# Patient Record
Sex: FEMALE | Race: WHITE | NOT HISPANIC OR LATINO | Employment: UNEMPLOYED | URBAN - METROPOLITAN AREA
[De-identification: names, ages, dates, MRNs, and addresses within clinical notes are randomized per-mention and may not be internally consistent; named-entity substitution may affect disease eponyms.]

---

## 2017-04-25 ENCOUNTER — NON-PROVIDER VISIT (OUTPATIENT)
Dept: OCCUPATIONAL MEDICINE | Facility: CLINIC | Age: 42
End: 2017-04-25

## 2017-04-25 DIAGNOSIS — Z02.1 PRE-EMPLOYMENT DRUG SCREENING: ICD-10-CM

## 2017-04-25 LAB
AMP AMPHETAMINE: NORMAL
COC COCAINE: NORMAL
INT CON NEG: NORMAL
INT CON POS: NORMAL
MET METHAMPHETAMINES: NORMAL
OPI OPIATES: NORMAL
PCP PHENCYCLIDINE: NORMAL
POC DRUG COMMENT 753798-OCCUPATIONAL HEALTH: NEGATIVE
THC: NORMAL

## 2017-04-25 PROCEDURE — 80305 DRUG TEST PRSMV DIR OPT OBS: CPT | Performed by: PREVENTIVE MEDICINE

## 2017-04-25 NOTE — MR AVS SNAPSHOT
Vicky Robertson Kenny   2017 3:20 PM   Non-Provider Visit   MRN: [id number]    Department:  [facility]   Dept Phone:  [phone]    Description:  Female : 1975   Provider:  KEV LAKHANI MA           Reason for Visit     Other pre-employment ds      Allergies as of 2017     Not on File      You were diagnosed with     Pre-employment drug screening   [330723]         Basic Information     Date Of Birth Sex Race Ethnicity Preferred Language    1975 Female White Non- English      Health Maintenance     Patient has no pending health maintenance at this time      Results     POCT 6 Panel Urine Drug Screen      Component    AMPHETAMINE    POC THC    COCAINE    OPIATES    PHENCYCLIDINE    METHAMPHETAMINES    POC Urine Drug Screen Comment    NEGATIVE    Internal Control Positive    Valid    Internal Control Negative    Valid                        Current Immunizations     No immunizations on file.      Below and/or attached are the medications your provider expects you to take. Review all of your home medications and newly ordered medications with your provider and/or pharmacist. Follow medication instructions as directed by your provider and/or pharmacist. Please keep your medication list with you and share with your provider. Update the information when medications are discontinued, doses are changed, or new medications (including over-the-counter products) are added; and carry medication information at all times in the event of emergency situations     Allergies:  (Not on file)          Medications  Valid as of: 2017 -  4:59 PM    Generic Name Brand Name Tablet Size Instructions for use    .                 Medicines prescribed today were sent to:     None      Medication refill instructions:       If your prescription bottle indicates you have medication refills left, it is not necessary to call your provider’s office. Please contact your pharmacy and they will  refill your medication.    If your prescription bottle indicates you do not have any refills left, you may request refills at any time through one of the following ways: The online [employer] system (except Urgent Care), by calling your provider’s office, or by asking your pharmacy to contact your provider’s office with a refill request. Medication refills are processed only during regular business hours and may not be available until the next business day. Your provider may request additional information or to have a follow-up visit with you prior to refilling your medication.   *Please Note: Medication refills are assigned a new Rx number when refilled electronically. Your pharmacy may indicate that no refills were authorized even though a new prescription for the same medication is available at the pharmacy. Please request the medicine by name with the pharmacy before contacting your provider for a refill.           [facility] Status: Patient Declined

## 2017-08-06 ENCOUNTER — HOSPITAL ENCOUNTER (EMERGENCY)
Dept: HOSPITAL 8 - ED | Age: 42
Discharge: HOME | End: 2017-08-06
Payer: COMMERCIAL

## 2017-08-06 VITALS — HEIGHT: 62 IN | WEIGHT: 114.2 LBS | BODY MASS INDEX: 21.02 KG/M2

## 2017-08-06 VITALS — SYSTOLIC BLOOD PRESSURE: 136 MMHG | DIASTOLIC BLOOD PRESSURE: 91 MMHG
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
## 2017-08-06 DIAGNOSIS — S50.11XA: Primary | ICD-10-CM

## 2017-08-06 PROCEDURE — 99283 EMERGENCY DEPT VISIT LOW MDM: CPT

## 2017-09-26 ENCOUNTER — OFFICE VISIT (OUTPATIENT)
Dept: MEDICAL GROUP | Facility: MEDICAL CENTER | Age: 42
End: 2017-09-26
Payer: COMMERCIAL

## 2017-09-26 VITALS
TEMPERATURE: 97.8 F | WEIGHT: 108.4 LBS | DIASTOLIC BLOOD PRESSURE: 72 MMHG | HEART RATE: 92 BPM | HEIGHT: 62 IN | RESPIRATION RATE: 16 BRPM | SYSTOLIC BLOOD PRESSURE: 110 MMHG | OXYGEN SATURATION: 100 % | BODY MASS INDEX: 19.95 KG/M2

## 2017-09-26 DIAGNOSIS — J06.9 ACUTE URI: ICD-10-CM

## 2017-09-26 DIAGNOSIS — F99: ICD-10-CM

## 2017-09-26 DIAGNOSIS — Z76.89 ENCOUNTER TO ESTABLISH CARE: ICD-10-CM

## 2017-09-26 PROCEDURE — 99204 OFFICE O/P NEW MOD 45 MIN: CPT | Performed by: PHYSICIAN ASSISTANT

## 2017-09-26 ASSESSMENT — PATIENT HEALTH QUESTIONNAIRE - PHQ9: CLINICAL INTERPRETATION OF PHQ2 SCORE: 0

## 2017-09-26 NOTE — ASSESSMENT & PLAN NOTE
This is a 41-year-old female complains of an almost 1 month history of sinus congestion drainage and coughing. Initially she was seen at an ED and given an antibiotic for strep throat. Strep throat was diagnosed on a rapid strep culture. She took the antibiotic for 6 days but couldn't handle diarrhea. Stop the medication. Denies any sore throat today. She did have a fever last night. It was 101. He is also coughing last night. Symptoms today have improved. She denies any shortness of breath or chest pain. She has missed work for many weeks and wants to go back to work. She is taking over-the-counter cold medications.

## 2017-09-26 NOTE — ASSESSMENT & PLAN NOTE
She has bipolar. She is not sure what type it is. She is on medications but doesn't know the medications. She also has a history of substance abuse. Use of methamphetamines which she stopped earlier this year. She is currently not with a psychiatrist. She complains of lots of stress in her life.

## 2017-09-26 NOTE — PROGRESS NOTES
"Subjective:   Vicky Cox is a 41 y.o. female here today for cold symptoms for almost a month.    Acute URI  This is a 41-year-old female complains of an almost 1 month history of sinus congestion drainage and coughing. Initially she was seen at an ED and given an antibiotic for strep throat. Strep throat was diagnosed on a rapid strep culture. She took the antibiotic for 6 days but couldn't handle diarrhea. Stop the medication. Denies any sore throat today. She did have a fever last night. It was 101. He is also coughing last night. Symptoms today have improved. She denies any shortness of breath or chest pain. She has missed work for many weeks and wants to go back to work. She is taking over-the-counter cold medications.    Co-occurrence of multiple mental disorders  She has bipolar. She is not sure what type it is. She is on medications but doesn't know the medications. She also has a history of substance abuse. Use of methamphetamines which she stopped earlier this year. She is currently not with a psychiatrist. She complains of lots of stress in her life.         Current medicines (including changes today)  No current outpatient prescriptions on file.     No current facility-administered medications for this visit.      She  has no past medical history on file.    ROS  No chest pain, no shortness of breath, no abdominal pain and all other systems were reviewed and are negative.       Objective:     Blood pressure 110/72, pulse 92, temperature 36.6 °C (97.8 °F), resp. rate 16, height 1.575 m (5' 2\"), weight 49.2 kg (108 lb 6.4 oz), last menstrual period 09/23/2017, SpO2 100 %, not currently breastfeeding. Body mass index is 19.83 kg/m².   Physical Exam:  Constitutional: Alert, no distress.  Skin: Warm, dry, good turgor, no rashes in visible areas.  Eye: Equal, round and reactive, conjunctiva clear, lids normal.  ENMT: Lips without lesions, good dentition, oropharynx clear.  Neck: Trachea midline, no " masses.   Lymph: No cervical or supraclavicular lymphadenopathy  Respiratory: Unlabored respiratory effort, lungs clear to auscultation, no wheezes, no ronchi.  Cardiovascular: Normal S1, S2, no murmur, no edema.  Abdomen: Soft, non-tender, no masses.  Psych: Alert and oriented x3, normal affect and mood.        Assessment and Plan:   The following treatment plan was discussed    1. Acute URI  Acute, new onset condition. Vitals and physical exam were unremarkable. Likely continuous viral symptom. Push fluids. May continue over-the-counter so cold medications. Contact me if she develops any worsening symptoms such as consistent fever.    2. Co-occurrence of multiple mental disorders  Chronic condition. Advised to follow up with a psychiatrist. She is looking for a psychiatrist that as well versed in cooccurrence. She will notify me if she needs a referral.    3. Encounter to establish care      Followup: Return if symptoms worsen or fail to improve.    Please note that this dictation was created using voice recognition software. I have made every reasonable attempt to correct obvious errors, but I expect that there are errors of grammar and possibly content that I did not discover before finalizing the note.

## 2017-09-26 NOTE — LETTER
September 26, 2017         Patient: Vicky Cox   YOB: 1975   Date of Visit: 9/26/2017           To Whom it May Concern:    Vicky Cox was seen in my clinic on 9/26/2017. She may return to work without restrictions on 9/27/17.    If you have any questions or concerns, please don't hesitate to call.        Sincerely,           Dale Ch P.A.-C.  Electronically Signed

## 2017-10-09 ENCOUNTER — APPOINTMENT (OUTPATIENT)
Dept: MEDICAL GROUP | Facility: MEDICAL CENTER | Age: 42
End: 2017-10-09
Payer: COMMERCIAL

## 2017-10-10 ENCOUNTER — OFFICE VISIT (OUTPATIENT)
Dept: MEDICAL GROUP | Facility: MEDICAL CENTER | Age: 42
End: 2017-10-10
Payer: COMMERCIAL

## 2017-10-10 ENCOUNTER — APPOINTMENT (OUTPATIENT)
Dept: MEDICAL GROUP | Facility: MEDICAL CENTER | Age: 42
End: 2017-10-10
Payer: COMMERCIAL

## 2017-10-10 VITALS
DIASTOLIC BLOOD PRESSURE: 70 MMHG | OXYGEN SATURATION: 97 % | HEART RATE: 76 BPM | WEIGHT: 107.4 LBS | RESPIRATION RATE: 16 BRPM | HEIGHT: 62 IN | SYSTOLIC BLOOD PRESSURE: 114 MMHG | BODY MASS INDEX: 19.77 KG/M2 | TEMPERATURE: 98.1 F

## 2017-10-10 DIAGNOSIS — M25.561 ACUTE PAIN OF RIGHT KNEE: ICD-10-CM

## 2017-10-10 PROBLEM — J06.9 ACUTE URI: Status: RESOLVED | Noted: 2017-09-26 | Resolved: 2017-10-10

## 2017-10-10 PROCEDURE — 99213 OFFICE O/P EST LOW 20 MIN: CPT | Performed by: PHYSICIAN ASSISTANT

## 2017-10-10 RX ORDER — NAPROXEN 500 MG/1
500 TABLET ORAL 2 TIMES DAILY WITH MEALS
Qty: 60 TAB | Refills: 0 | Status: SHIPPED | OUTPATIENT
Start: 2017-10-10 | End: 2021-11-23

## 2017-10-10 NOTE — ASSESSMENT & PLAN NOTE
This is a 41-year-old female accompanied by her son. She complains of an approximate four-day history of right knee pain. Denies trauma. Pain initially started with swelling. Pain is generalized but worse in the back and radiates up the leg. Over the past 3 days she has not been going to work. She works in [employer]. Has to lift and move lots of boxes daily. States is a very physically demanding job. She returns home very fatigued after her job. She has been taking ibuprofen 600 mg once or twice a day. Been elevating and icing it. Or a brace. Still with pain in the back of her knee. Has difficult time fully extending the knee.

## 2017-10-10 NOTE — PROGRESS NOTES
"Subjective:   Vicky Cox is a 41 y.o. female here today for right knee pain for approximately 4 days.    Acute pain of right knee  This is a 41-year-old female accompanied by her son. She complains of an approximate four-day history of right knee pain. Denies trauma. Pain initially started with swelling. Pain is generalized but worse in the back and radiates up the leg. Over the past 3 days she has not been going to work. She works in [employer]. Has to lift and move lots of boxes daily. States is a very physically demanding job. She returns home very fatigued after her job. She has been taking ibuprofen 600 mg once or twice a day. Been elevating and icing it. Or a brace. Still with pain in the back of her knee. Has difficult time fully extending the knee.       Current medicines (including changes today)  Current Outpatient Prescriptions   Medication Sig Dispense Refill   • naproxen (NAPROSYN) 500 MG Tab Take 1 Tab by mouth 2 times a day, with meals. 60 Tab 0     No current facility-administered medications for this visit.      She  has no past medical history on file.    ROS   No chest pain, no shortness of breath, no abdominal pain and all other systems were reviewed and are negative.       Objective:     Blood pressure 114/70, pulse 76, temperature 36.7 °C (98.1 °F), resp. rate 16, height 1.575 m (5' 2\"), weight 48.7 kg (107 lb 6.4 oz), last menstrual period 09/23/2017, SpO2 97 %. Body mass index is 19.64 kg/m².   Physical Exam:  Constitutional: Alert, no distress.  Skin: Warm, dry, good turgor, no rashes in visible areas.  Eye: Equal, round and reactive, conjunctiva clear, lids normal.  ENMT: Lips without lesions, good dentition, oropharynx clear.  Neck: Trachea midline, no masses.   Respiratory: Unlabored respiratory effort, lungs appear clear, no wheezes.  Cardiovascular: Normal S1, S2, no murmur, no edema.  Musculoskeletal: Bilateral knees appear symmetrical. No effusion noted. No ecchymosis. Able " to extend knee fully with assist. Muscle strength is 5 out of 5. Generalized tenderness in the worsen the back. No significant change in gait.  Psych: Alert and oriented x3, normal affect and mood.        Assessment and Plan:   The following treatment plan was discussed    1. Acute pain of right knee  New-onset condition. Likely muscle strain. Prescribed naproxen twice a day with food. Note written for light duty for 2 weeks. Advised to contact me if she has repeated swelling of the knee. I referred her to physical therapy and "[employer]".  - naproxen (NAPROSYN) 500 MG Tab; Take 1 Tab by mouth 2 times a day, with meals.  Dispense: 60 Tab; Refill: 0  - REFERRAL TO PHYSICAL THERAPY Reason for Therapy: Eval/Treat/Report      Followup: Return if symptoms worsen or fail to improve.    Please note that this dictation was created using voice recognition software. I have made every reasonable attempt to correct obvious errors, but I expect that there are errors of grammar and possibly content that I did not discover before finalizing the note.

## 2017-10-10 NOTE — LETTER
October 10, 2017         Patient: Vicky Cox   YOB: 1975   Date of Visit: 10/10/2017           To Whom it May Concern:    Vicky Cox was seen in my clinic on 10/10/2017. Please allow her to perform light duty for two weeks so no pulling, lifting or pushing 20 pounds until 10/24/17.    If you have any questions or concerns, please don't hesitate to call.        Sincerely,           Dale Ch P.A.-C.  Electronically Signed

## 2018-06-05 ENCOUNTER — OFFICE VISIT (OUTPATIENT)
Dept: URGENT CARE | Facility: CLINIC | Age: 43
End: 2018-06-05
Payer: COMMERCIAL

## 2018-06-05 VITALS
DIASTOLIC BLOOD PRESSURE: 84 MMHG | RESPIRATION RATE: 16 BRPM | TEMPERATURE: 98.5 F | OXYGEN SATURATION: 100 % | HEIGHT: 62 IN | WEIGHT: 107 LBS | BODY MASS INDEX: 19.69 KG/M2 | HEART RATE: 82 BPM | SYSTOLIC BLOOD PRESSURE: 120 MMHG

## 2018-06-05 DIAGNOSIS — K52.9 AGE (ACUTE GASTROENTERITIS): ICD-10-CM

## 2018-06-05 PROCEDURE — 99214 OFFICE O/P EST MOD 30 MIN: CPT | Performed by: FAMILY MEDICINE

## 2018-06-05 RX ORDER — ONDANSETRON 4 MG/1
4 TABLET, ORALLY DISINTEGRATING ORAL ONCE
Status: COMPLETED | OUTPATIENT
Start: 2018-06-05 | End: 2018-06-05

## 2018-06-05 RX ORDER — ONDANSETRON 4 MG/1
4 TABLET, ORALLY DISINTEGRATING ORAL EVERY 8 HOURS PRN
Qty: 12 TAB | Refills: 0 | Status: SHIPPED | OUTPATIENT
Start: 2018-06-05 | End: 2021-11-23

## 2018-06-05 RX ADMIN — ONDANSETRON 4 MG: 4 TABLET, ORALLY DISINTEGRATING ORAL at 12:58

## 2018-06-05 ASSESSMENT — ENCOUNTER SYMPTOMS
FOCAL WEAKNESS: 0
DIARRHEA: 1
NAUSEA: 1
CHILLS: 0
DIZZINESS: 0
FEVER: 0
VOMITING: 1
BLOOD IN STOOL: 0

## 2018-06-05 NOTE — LETTER
June 5, 2018         Patient: Vicky Cox   YOB: 1975   Date of Visit: 6/5/2018           To Whom it May Concern:    Vicky Cox was seen in my clinic on 6/5/2018. She may return to work in 2-3 days.    If you have any questions or concerns, please don't hesitate to call.        Sincerely,           Maxwell Gibson M.D.  Electronically Signed

## 2018-06-05 NOTE — PROGRESS NOTES
"Subjective:      Vicky Cox is a 42 y.o. female who presents with Emesis (x 6 days, diarrhea, emesis each day, fever, threw up this morning, not being able to eat. )    Chief Complaint   Patient presents with   • Emesis     x 6 days, diarrhea, emesis each day, fever, threw up this morning, not being able to eat.         - This is a very pleasant 42 y.o. female with complaints of non-bloody vomiting/diarrhea ~2-6x/days for past 5 days. No abd pain or fever past couple days or recent travel or abx use.           ALLERGIES:  Penicillins     PMH:  History reviewed. No pertinent past medical history.     MEDS:    Current Outpatient Prescriptions:   •  ondansetron (ZOFRAN ODT) 4 MG TABLET DISPERSIBLE, Take 1 Tab by mouth every 8 hours as needed., Disp: 12 Tab, Rfl: 0  •  naproxen (NAPROSYN) 500 MG Tab, Take 1 Tab by mouth 2 times a day, with meals., Disp: 60 Tab, Rfl: 0    Current Facility-Administered Medications:   •  ondansetron (ZOFRAN ODT) dispertab 4 mg, 4 mg, Oral, Once, Maxwell Gibson M.D.    ** I have documented what I find to be significant in regards to past medical, social, family and surgical history  in my HPI or under PMH/PSH/FH review section, otherwise it is contributory **           HPI    Review of Systems   Constitutional: Negative for chills and fever.   Gastrointestinal: Positive for diarrhea, nausea and vomiting. Negative for blood in stool.   Neurological: Negative for dizziness and focal weakness.          Objective:     /84   Pulse 82   Temp 36.9 °C (98.5 °F)   Resp 16   Ht 1.575 m (5' 2\")   Wt 48.5 kg (107 lb)   SpO2 100%   BMI 19.57 kg/m²      Physical Exam   Constitutional: She appears well-developed. No distress.   HENT:   Head: Normocephalic and atraumatic.   Mouth/Throat: Oropharynx is clear and moist.   Eyes: Conjunctivae are normal.   Neck: Neck supple.   Cardiovascular: Regular rhythm.    No murmur heard.  Pulmonary/Chest: Effort normal. No respiratory distress. "   Abdominal: Soft. There is no tenderness. There is no rebound and no guarding.   Neurological: She is alert. She exhibits normal muscle tone.   Skin: Skin is warm and dry.   Psychiatric: She has a normal mood and affect. Judgment normal.   Nursing note and vitals reviewed.              Assessment/Plan:         1. AGE (acute gastroenteritis)  ondansetron (ZOFRAN ODT) 4 MG TABLET DISPERSIBLE    ondansetron (ZOFRAN ODT) dispertab 4 mg         - rest hydrate   - bland diet     Dx & d/c instructions discussed w/ patient and/or family members. Follow up w/ Prvt Dr or here in 3-4 days if not getting better, sooner if needed,  ER if worse and UC/PCP unavailable.        Possible side effects (i.e. Rash, GI upset/constipation, sedation, elevation of BP or sugars) of any medications given discussed.

## 2018-06-21 ENCOUNTER — APPOINTMENT (OUTPATIENT)
Dept: MEDICAL GROUP | Facility: MEDICAL CENTER | Age: 43
End: 2018-06-21
Payer: COMMERCIAL

## 2019-08-05 ENCOUNTER — HOSPITAL ENCOUNTER (EMERGENCY)
Dept: HOSPITAL 8 - ED | Age: 44
Discharge: HOME | End: 2019-08-05
Payer: MEDICAID

## 2019-08-05 VITALS — DIASTOLIC BLOOD PRESSURE: 87 MMHG | SYSTOLIC BLOOD PRESSURE: 130 MMHG

## 2019-08-05 VITALS — HEIGHT: 62 IN | WEIGHT: 115.3 LBS | BODY MASS INDEX: 21.22 KG/M2

## 2019-08-05 DIAGNOSIS — N10: Primary | ICD-10-CM

## 2019-08-05 DIAGNOSIS — Z98.890: ICD-10-CM

## 2019-08-05 PROCEDURE — 36415 COLL VENOUS BLD VENIPUNCTURE: CPT

## 2019-08-05 PROCEDURE — 84703 CHORIONIC GONADOTROPIN ASSAY: CPT

## 2019-08-05 PROCEDURE — 83690 ASSAY OF LIPASE: CPT

## 2019-08-05 PROCEDURE — 81001 URINALYSIS AUTO W/SCOPE: CPT

## 2019-08-05 PROCEDURE — 76700 US EXAM ABDOM COMPLETE: CPT

## 2019-08-05 PROCEDURE — 87086 URINE CULTURE/COLONY COUNT: CPT

## 2019-08-05 PROCEDURE — 80053 COMPREHEN METABOLIC PANEL: CPT

## 2019-08-05 PROCEDURE — 99284 EMERGENCY DEPT VISIT MOD MDM: CPT

## 2019-08-05 PROCEDURE — 85025 COMPLETE CBC W/AUTO DIFF WBC: CPT

## 2020-01-21 ENCOUNTER — HOSPITAL ENCOUNTER (EMERGENCY)
Dept: HOSPITAL 8 - ED | Age: 45
Discharge: HOME | End: 2020-01-21
Payer: MEDICAID

## 2020-01-21 VITALS — DIASTOLIC BLOOD PRESSURE: 79 MMHG | SYSTOLIC BLOOD PRESSURE: 107 MMHG

## 2020-01-21 VITALS — BODY MASS INDEX: 21.42 KG/M2 | HEIGHT: 62 IN | WEIGHT: 116.4 LBS

## 2020-01-21 DIAGNOSIS — B34.9: ICD-10-CM

## 2020-01-21 DIAGNOSIS — J45.31: Primary | ICD-10-CM

## 2020-01-21 PROCEDURE — 71046 X-RAY EXAM CHEST 2 VIEWS: CPT

## 2020-01-21 PROCEDURE — 99283 EMERGENCY DEPT VISIT LOW MDM: CPT

## 2020-01-21 PROCEDURE — 94640 AIRWAY INHALATION TREATMENT: CPT

## 2020-09-02 ENCOUNTER — HOSPITAL ENCOUNTER (EMERGENCY)
Dept: HOSPITAL 8 - ED | Age: 45
Discharge: HOME | End: 2020-09-02
Payer: MEDICAID

## 2020-09-02 VITALS — SYSTOLIC BLOOD PRESSURE: 120 MMHG | DIASTOLIC BLOOD PRESSURE: 75 MMHG

## 2020-09-02 VITALS — WEIGHT: 113.32 LBS | HEIGHT: 63 IN | BODY MASS INDEX: 20.08 KG/M2

## 2020-09-02 DIAGNOSIS — F17.210: ICD-10-CM

## 2020-09-02 DIAGNOSIS — R06.02: ICD-10-CM

## 2020-09-02 DIAGNOSIS — M47.814: Primary | ICD-10-CM

## 2020-09-02 DIAGNOSIS — R10.13: ICD-10-CM

## 2020-09-02 DIAGNOSIS — R00.0: ICD-10-CM

## 2020-09-02 DIAGNOSIS — J45.909: ICD-10-CM

## 2020-09-02 DIAGNOSIS — R11.0: ICD-10-CM

## 2020-09-02 DIAGNOSIS — R07.9: ICD-10-CM

## 2020-09-02 LAB
ALBUMIN SERPL-MCNC: 3.8 G/DL (ref 3.4–5)
ALP SERPL-CCNC: 60 U/L (ref 45–117)
ALT SERPL-CCNC: 16 U/L (ref 12–78)
ANION GAP SERPL CALC-SCNC: 5 MMOL/L (ref 5–15)
BASOPHILS # BLD AUTO: 0.02 X10^3/UL (ref 0–0.1)
BASOPHILS NFR BLD AUTO: 0 % (ref 0–1)
BILIRUB SERPL-MCNC: 0.8 MG/DL (ref 0.2–1)
CALCIUM SERPL-MCNC: 9.6 MG/DL (ref 8.5–10.1)
CHLORIDE SERPL-SCNC: 107 MMOL/L (ref 98–107)
CREAT SERPL-MCNC: 0.79 MG/DL (ref 0.55–1.02)
EOSINOPHIL # BLD AUTO: 0.06 X10^3/UL (ref 0–0.4)
EOSINOPHIL NFR BLD AUTO: 1 % (ref 1–7)
ERYTHROCYTE [DISTWIDTH] IN BLOOD BY AUTOMATED COUNT: 12.8 % (ref 9.6–15.2)
LYMPHOCYTES # BLD AUTO: 1.49 X10^3/UL (ref 1–3.4)
LYMPHOCYTES NFR BLD AUTO: 14 % (ref 22–44)
MCH RBC QN AUTO: 30.3 PG (ref 27–34.8)
MCHC RBC AUTO-ENTMCNC: 32.7 G/DL (ref 32.4–35.8)
MCV RBC AUTO: 92.5 FL (ref 80–100)
MD: NO
MONOCYTES # BLD AUTO: 1.11 X10^3/UL (ref 0.2–0.8)
MONOCYTES NFR BLD AUTO: 11 % (ref 2–9)
NEUTROPHILS # BLD AUTO: 7.81 X10^3/UL (ref 1.8–6.8)
NEUTROPHILS NFR BLD AUTO: 75 % (ref 42–75)
PLATELET # BLD AUTO: 217 X10^3/UL (ref 130–400)
PMV BLD AUTO: 8.9 FL (ref 7.4–10.4)
PROT SERPL-MCNC: 8 G/DL (ref 6.4–8.2)
RBC # BLD AUTO: 4.59 X10^6/UL (ref 3.82–5.3)

## 2020-09-02 PROCEDURE — 36415 COLL VENOUS BLD VENIPUNCTURE: CPT

## 2020-09-02 PROCEDURE — 93005 ELECTROCARDIOGRAM TRACING: CPT

## 2020-09-02 PROCEDURE — 85379 FIBRIN DEGRADATION QUANT: CPT

## 2020-09-02 PROCEDURE — 80053 COMPREHEN METABOLIC PANEL: CPT

## 2020-09-02 PROCEDURE — 99285 EMERGENCY DEPT VISIT HI MDM: CPT

## 2020-09-02 PROCEDURE — 96374 THER/PROPH/DIAG INJ IV PUSH: CPT

## 2020-09-02 PROCEDURE — 85025 COMPLETE CBC W/AUTO DIFF WBC: CPT

## 2020-09-02 PROCEDURE — 96375 TX/PRO/DX INJ NEW DRUG ADDON: CPT

## 2020-09-02 PROCEDURE — 71046 X-RAY EXAM CHEST 2 VIEWS: CPT

## 2020-09-02 PROCEDURE — 83690 ASSAY OF LIPASE: CPT

## 2020-09-02 PROCEDURE — 84703 CHORIONIC GONADOTROPIN ASSAY: CPT

## 2020-09-02 NOTE — NUR
PT RESTING ON GURNEY IN NAD, REPORTS PAIN HAS SUBSIDED. VSS. FALL PRECAUTIONS 
IN PLACE. CALL LIGHT WITHIN REACH.

## 2020-10-07 ENCOUNTER — HOSPITAL ENCOUNTER (OUTPATIENT)
Dept: RADIOLOGY | Facility: MEDICAL CENTER | Age: 45
End: 2020-10-07
Attending: FAMILY MEDICINE
Payer: MEDICAID

## 2020-10-07 DIAGNOSIS — N92.6 IRREGULAR MENSTRUAL BLEEDING: ICD-10-CM

## 2021-03-11 ENCOUNTER — OFFICE VISIT (OUTPATIENT)
Dept: URGENT CARE | Facility: CLINIC | Age: 46
End: 2021-03-11
Payer: MEDICAID

## 2021-03-11 VITALS
DIASTOLIC BLOOD PRESSURE: 68 MMHG | OXYGEN SATURATION: 97 % | HEIGHT: 63 IN | BODY MASS INDEX: 18.96 KG/M2 | TEMPERATURE: 97 F | SYSTOLIC BLOOD PRESSURE: 104 MMHG | HEART RATE: 93 BPM | WEIGHT: 107 LBS | RESPIRATION RATE: 16 BRPM

## 2021-03-11 DIAGNOSIS — H60.311 ACUTE DIFFUSE OTITIS EXTERNA OF RIGHT EAR: ICD-10-CM

## 2021-03-11 DIAGNOSIS — H65.01 RIGHT ACUTE SEROUS OTITIS MEDIA, RECURRENCE NOT SPECIFIED: ICD-10-CM

## 2021-03-11 PROCEDURE — 99213 OFFICE O/P EST LOW 20 MIN: CPT | Performed by: NURSE PRACTITIONER

## 2021-03-11 RX ORDER — BREXPIPRAZOLE 1 MG/1
1 TABLET ORAL
COMMUNITY
Start: 2021-01-25 | End: 2023-03-16

## 2021-03-11 RX ORDER — AZITHROMYCIN 250 MG/1
TABLET, FILM COATED ORAL
Qty: 6 TABLET | Refills: 0 | Status: SHIPPED | OUTPATIENT
Start: 2021-03-11 | End: 2021-11-23

## 2021-03-11 RX ORDER — DULOXETIN HYDROCHLORIDE 20 MG/1
20 CAPSULE, DELAYED RELEASE ORAL
COMMUNITY
Start: 2021-01-25 | End: 2023-03-16

## 2021-03-11 RX ORDER — OFLOXACIN 3 MG/ML
5 SOLUTION AURICULAR (OTIC) DAILY
Qty: 10 ML | Refills: 0 | Status: SHIPPED | OUTPATIENT
Start: 2021-03-11 | End: 2021-03-18

## 2021-03-11 RX ORDER — BREXPIPRAZOLE 2 MG/1
1 TABLET ORAL
COMMUNITY
Start: 2021-01-26 | End: 2021-11-23

## 2021-03-11 NOTE — PROGRESS NOTES
Subjective:      Vicky Cox is a 45 y.o. female who presents with Cerumen Impaction (x3, (R) ear, muffled hearing, ear pain )    History reviewed. No pertinent past medical history.  Social History     Socioeconomic History   • Marital status:      Spouse name: Not on file   • Number of children: Not on file   • Years of education: Not on file   • Highest education level: Not on file   Occupational History   • Not on file   Tobacco Use   • Smoking status: Former Smoker     Quit date: 9/3/2017     Years since quitting: 3.5   • Smokeless tobacco: Never Used   Substance and Sexual Activity   • Alcohol use: Yes     Comment: occasionally   • Drug use: No     Types: Methamphetamines     Comment: sober since 02/17   • Sexual activity: Not Currently     Partners: Male   Other Topics Concern   • Not on file   Social History Narrative   • Not on file     Social Determinants of Health     Financial Resource Strain:    • Difficulty of Paying Living Expenses:    Food Insecurity:    • Worried About Running Out of Food in the Last Year:    • Ran Out of Food in the Last Year:    Transportation Needs:    • Lack of Transportation (Medical):    • Lack of Transportation (Non-Medical):    Physical Activity:    • Days of Exercise per Week:    • Minutes of Exercise per Session:    Stress:    • Feeling of Stress :    Social Connections:    • Frequency of Communication with Friends and Family:    • Frequency of Social Gatherings with Friends and Family:    • Attends [employer] Services:    • Active Member of Clubs or Organizations:    • Attends Club or Organization Meetings:    • Marital Status:    Intimate Partner Violence:    • Fear of Current or Ex-Partner:    • Emotionally Abused:    • Physically Abused:    • Sexually Abused:      History reviewed. No pertinent family history.    Allergies: Penicillins    Patient is a 45-year-old female who presents today with complaint of concern for impactions of cerumen to the right  "ear.  Patient states 3 days ago her right ear began hurting and she has noted decreased hearing.  Patient states that she has had a previous history of ear infections.  She does have seasonal allergies as well.  No other upper respiratory symptoms.        Cerumen Impaction  This is a new problem. The current episode started in the past 7 days. The problem occurs constantly. The problem has been unchanged. Nothing aggravates the symptoms. She has tried nothing for the symptoms. The treatment provided no relief.       Review of Systems   HENT: Positive for hearing loss.    All other systems reviewed and are negative.         Objective:     /68 (Patient Position: Sitting)   Pulse 93   Temp 36.1 °C (97 °F) (Temporal)   Resp 16   Ht 1.6 m (5' 3\")   Wt 48.5 kg (107 lb)   SpO2 97%   BMI 18.95 kg/m²      Physical Exam  Vitals reviewed.   Constitutional:       Appearance: Normal appearance.   HENT:      Head: Normocephalic and atraumatic.      Right Ear: There is no impacted cerumen.      Left Ear: Tympanic membrane, ear canal and external ear normal. There is no impacted cerumen.      Ears:      Comments: There is no impacted cerumen in the right EAC.  However, the EAC is bright red and there is redness of the right tympanic membrane.     Nose: Nose normal.   Eyes:      Extraocular Movements: Extraocular movements intact.      Conjunctiva/sclera: Conjunctivae normal.      Pupils: Pupils are equal, round, and reactive to light.   Neurological:      Mental Status: She is alert.                 Assessment/Plan:     Right otitis media  Right otitis externa  Decreased hearing; suspect eustachian tube dysfunction    Floxin otic drops  Zithromax  Flonase  Consider referral to ENT for persistent or worsening of symptoms  Call or return to urgent care otherwise for any further questions or concerns       There are no diagnoses linked to this encounter.    "

## 2021-11-23 ENCOUNTER — OFFICE VISIT (OUTPATIENT)
Dept: URGENT CARE | Facility: CLINIC | Age: 46
End: 2021-11-23
Payer: MEDICAID

## 2021-11-23 ENCOUNTER — HOSPITAL ENCOUNTER (OUTPATIENT)
Facility: MEDICAL CENTER | Age: 46
End: 2021-11-23
Attending: NURSE PRACTITIONER
Payer: MEDICAID

## 2021-11-23 VITALS
BODY MASS INDEX: 23.15 KG/M2 | DIASTOLIC BLOOD PRESSURE: 60 MMHG | TEMPERATURE: 97 F | HEIGHT: 62 IN | RESPIRATION RATE: 18 BRPM | OXYGEN SATURATION: 96 % | WEIGHT: 125.8 LBS | SYSTOLIC BLOOD PRESSURE: 110 MMHG | HEART RATE: 99 BPM

## 2021-11-23 DIAGNOSIS — J22 LRTI (LOWER RESPIRATORY TRACT INFECTION): ICD-10-CM

## 2021-11-23 DIAGNOSIS — R05.9 COUGH: ICD-10-CM

## 2021-11-23 PROCEDURE — 99214 OFFICE O/P EST MOD 30 MIN: CPT | Performed by: NURSE PRACTITIONER

## 2021-11-23 PROCEDURE — U0005 INFEC AGEN DETEC AMPLI PROBE: HCPCS

## 2021-11-23 PROCEDURE — U0003 INFECTIOUS AGENT DETECTION BY NUCLEIC ACID (DNA OR RNA); SEVERE ACUTE RESPIRATORY SYNDROME CORONAVIRUS 2 (SARS-COV-2) (CORONAVIRUS DISEASE [COVID-19]), AMPLIFIED PROBE TECHNIQUE, MAKING USE OF HIGH THROUGHPUT TECHNOLOGIES AS DESCRIBED BY CMS-2020-01-R: HCPCS

## 2021-11-23 RX ORDER — DEXTROMETHORPHAN HYDROBROMIDE AND PROMETHAZINE HYDROCHLORIDE 15; 6.25 MG/5ML; MG/5ML
5 SYRUP ORAL EVERY 4 HOURS PRN
Qty: 120 ML | Refills: 0 | Status: SHIPPED | OUTPATIENT
Start: 2021-11-23 | End: 2023-03-16

## 2021-11-23 RX ORDER — DOXYCYCLINE HYCLATE 100 MG
100 TABLET ORAL 2 TIMES DAILY
Qty: 14 TABLET | Refills: 0 | Status: SHIPPED | OUTPATIENT
Start: 2021-11-23 | End: 2021-11-30

## 2021-11-23 RX ORDER — ALBUTEROL SULFATE 90 UG/1
2 AEROSOL, METERED RESPIRATORY (INHALATION) EVERY 6 HOURS PRN
Qty: 8.5 G | Refills: 0 | Status: SHIPPED | OUTPATIENT
Start: 2021-11-23 | End: 2023-03-16

## 2021-11-24 DIAGNOSIS — R05.9 COUGH: ICD-10-CM

## 2021-11-24 LAB — COVID ORDER STATUS COVID19: NORMAL

## 2021-11-25 LAB
SARS-COV-2 RNA RESP QL NAA+PROBE: NOTDETECTED
SPECIMEN SOURCE: NORMAL

## 2021-11-26 ASSESSMENT — ENCOUNTER SYMPTOMS
MYALGIAS: 0
NAUSEA: 0
RHINORRHEA: 1
SORE THROAT: 0
VOMITING: 0
WHEEZING: 0
COUGH: 1
HEADACHES: 1
FEVER: 0
SHORTNESS OF BREATH: 0
EYE PAIN: 0
DIZZINESS: 0
CHILLS: 1

## 2021-11-26 NOTE — PROGRESS NOTES
"Subjective:   Vicky Cox is a 46 y.o. female who presents for Shortness of Breath (coughing, dry cough, fatigue x week )      URI   This is a new problem. The current episode started in the past 7 days. The problem has been gradually worsening. There has been no fever. Associated symptoms include congestion, coughing, headaches and rhinorrhea. Pertinent negatives include no chest pain, ear pain, nausea, rash, sore throat, vomiting or wheezing. She has tried acetaminophen for the symptoms. The treatment provided no relief.       Review of Systems   Constitutional: Positive for chills and malaise/fatigue. Negative for fever.   HENT: Positive for congestion and rhinorrhea. Negative for ear pain and sore throat.    Eyes: Negative for pain.   Respiratory: Positive for cough. Negative for shortness of breath and wheezing.    Cardiovascular: Negative for chest pain.   Gastrointestinal: Negative for nausea and vomiting.   Genitourinary: Negative for hematuria.   Musculoskeletal: Negative for myalgias.   Skin: Negative for rash.   Neurological: Positive for headaches. Negative for dizziness.       Medications:    • albuterol Aers  • doxycycline Tabs  • DULoxetine Cpep  • MOTRIN PO  • promethazine-dextromethorphan  • Rexulti Tabs    Allergies: Penicillins    Problem List: Vicky Cox does not have any pertinent problems on file.    Surgical History:  No past surgical history on file.    Past Social Hx: Vicky Cox  reports that she quit smoking about 4 years ago. She has never used smokeless tobacco. She reports current alcohol use. She reports that she does not use drugs.     Past Family Hx:  Vicky Cox family history is not on file.     Problem list, medications, and allergies reviewed by myself today in Epic.     Objective:     /60   Pulse 99   Temp 36.1 °C (97 °F)   Resp 18   Ht 1.575 m (5' 2\")   Wt 57.1 kg (125 lb 12.8 oz)   SpO2 96%   BMI 23.01 kg/m²     Physical " Exam  Vitals and nursing note reviewed.   Constitutional:       General: She is not in acute distress.     Appearance: She is well-developed.   HENT:      Head: Normocephalic and atraumatic.      Right Ear: Tympanic membrane and external ear normal.      Left Ear: Tympanic membrane and external ear normal.      Nose: Nose normal.      Right Sinus: No maxillary sinus tenderness or frontal sinus tenderness.      Left Sinus: No maxillary sinus tenderness or frontal sinus tenderness.      Mouth/Throat:      Mouth: Mucous membranes are moist.      Pharynx: Uvula midline. No posterior oropharyngeal erythema.      Tonsils: No tonsillar exudate or tonsillar abscesses.   Eyes:      General:         Right eye: No discharge.         Left eye: No discharge.      Conjunctiva/sclera: Conjunctivae normal.   Cardiovascular:      Rate and Rhythm: Normal rate.   Pulmonary:      Effort: Pulmonary effort is normal. No respiratory distress.      Breath sounds: Normal breath sounds.   Abdominal:      General: There is no distension.   Musculoskeletal:         General: Normal range of motion.   Skin:     General: Skin is warm and dry.   Neurological:      General: No focal deficit present.      Mental Status: She is alert and oriented to person, place, and time. Mental status is at baseline.      Gait: Gait (gait at baseline) normal.   Psychiatric:         Judgment: Judgment normal.         Assessment/Plan:     Diagnosis and associated orders:     1. LRTI (lower respiratory tract infection)  doxycycline (VIBRAMYCIN) 100 MG Tab    promethazine-dextromethorphan (PROMETHAZINE-DM) 6.25-15 MG/5ML syrup    albuterol 108 (90 Base) MCG/ACT Aero Soln inhalation aerosol   2. Cough  doxycycline (VIBRAMYCIN) 100 MG Tab    promethazine-dextromethorphan (PROMETHAZINE-DM) 6.25-15 MG/5ML syrup    albuterol 108 (90 Base) MCG/ACT Aero Soln inhalation aerosol    SARS-CoV-2 PCR (24 hour In-House): Collect NP swab in VTM      Comments/MDM:     Advised to  continue supportive care with Tylenol and/or ibuprofen for fevers and discomfort. Increased fluids and electrolytes. Contingent antibiotic prescription given to patient to fill upon meeting criteria of guidelines discussed. . I personally reviewed prior external notes and prior test results pertinent to today's visit.   Discussed management options, risks and benefits, and alternatives to treatment plan agreed upon.   Red flags discussed and indications to immediately call 911 or present to the Emergency Department.   Supportive care, differential diagnoses, and indications for immediate follow-up discussed with patient.    • Patient expresses understanding and agrees to plan. Patient denies any other questions or concerns.   •              Please note that this dictation was created using voice recognition software. I have made a reasonable attempt to correct obvious errors, but I expect that there are errors of grammar and possibly content that I did not discover before finalizing the note.    This note was electronically signed by Noe STANLEY.

## 2021-12-01 ENCOUNTER — APPOINTMENT (OUTPATIENT)
Dept: RADIOLOGY | Facility: MEDICAL CENTER | Age: 46
End: 2021-12-01
Attending: EMERGENCY MEDICINE
Payer: MEDICAID

## 2021-12-01 ENCOUNTER — HOSPITAL ENCOUNTER (EMERGENCY)
Facility: MEDICAL CENTER | Age: 46
End: 2021-12-01
Attending: EMERGENCY MEDICINE
Payer: MEDICAID

## 2021-12-01 VITALS
TEMPERATURE: 98 F | BODY MASS INDEX: 22.8 KG/M2 | SYSTOLIC BLOOD PRESSURE: 126 MMHG | OXYGEN SATURATION: 96 % | DIASTOLIC BLOOD PRESSURE: 81 MMHG | HEIGHT: 62 IN | RESPIRATION RATE: 18 BRPM | WEIGHT: 123.9 LBS | HEART RATE: 86 BPM

## 2021-12-01 DIAGNOSIS — J20.9 ACUTE BRONCHITIS WITH BRONCHOSPASM: ICD-10-CM

## 2021-12-01 PROCEDURE — 99283 EMERGENCY DEPT VISIT LOW MDM: CPT

## 2021-12-01 PROCEDURE — 700102 HCHG RX REV CODE 250 W/ 637 OVERRIDE(OP): Performed by: EMERGENCY MEDICINE

## 2021-12-01 PROCEDURE — A9270 NON-COVERED ITEM OR SERVICE: HCPCS | Performed by: EMERGENCY MEDICINE

## 2021-12-01 PROCEDURE — 700111 HCHG RX REV CODE 636 W/ 250 OVERRIDE (IP): Performed by: EMERGENCY MEDICINE

## 2021-12-01 PROCEDURE — 71045 X-RAY EXAM CHEST 1 VIEW: CPT

## 2021-12-01 RX ORDER — BENZONATATE 100 MG/1
100 CAPSULE ORAL 3 TIMES DAILY PRN
Qty: 60 CAPSULE | Refills: 0 | Status: SHIPPED | OUTPATIENT
Start: 2021-12-01 | End: 2023-03-16

## 2021-12-01 RX ORDER — BENZONATATE 100 MG/1
100 CAPSULE ORAL ONCE
Status: COMPLETED | OUTPATIENT
Start: 2021-12-01 | End: 2021-12-01

## 2021-12-01 RX ORDER — PREDNISONE 50 MG/1
50 TABLET ORAL DAILY
Qty: 5 TABLET | Refills: 0 | Status: SHIPPED | OUTPATIENT
Start: 2021-12-01 | End: 2021-12-06

## 2021-12-01 RX ADMIN — BENZONATATE 100 MG: 100 CAPSULE ORAL at 22:45

## 2021-12-01 RX ADMIN — PREDNISONE 60 MG: 50 TABLET ORAL at 22:45

## 2021-12-02 NOTE — ED PROVIDER NOTES
ED Provider Note    ED Provider Note    Scribed for Calvin Garcia MD by Calvin Garcia M.D.. 2021, 10:23 PM.    Primary care provider: Dale Ch P.A.-C.  Means of arrival: Private  History obtained from: Patient  History limited by: None    CHIEF COMPLAINT  Chief Complaint   Patient presents with   • Cough     Patient walk-in by self. Pt c/o dry cough x2 weeks, runny nose, fatigue. Pt went to urgent care on  & got tested for covid & was negative. Unvaccinated. Pt uses vape pen to smoke THC.       HPI  Vicky Cox is a 46 y.o. female who presents to the Emergency Department for evaluation of cough.  Patient's been ill for last 2 weeks.  She notes body aches but has not had a fever.  No nausea, no vomiting.  She notes runny nose and rarely some scant sputum production.  Patient currently on promethazine cough syrup and doxycycline.  She was tested for COVID-19 on the , negative at that time.  No ill contacts, she does note history of asthma and relates with more exacerbations in the last year or so.  She does smoke with a vape pen.  She has not required her inhaler for some time but was written for albuterol as well when she was seen in the urgent care on .  Given persistent symptoms she comes to be assessed.    REVIEW OF SYSTEMS  Pertinent positives include cough, hoarse voice. Pertinent negatives include no fever, no chest pain, no particular ill contacts.      PAST MEDICAL HISTORY   has a past medical history of Patient denies medical problems.    SURGICAL HISTORY  patient denies any surgical history    SOCIAL HISTORY  Social History     Tobacco Use   • Smoking status: Former Smoker     Quit date: 9/3/2017     Years since quittin.2   • Smokeless tobacco: Never Used   Vaping Use   • Vaping Use: Some days   • Substances: THC   Substance Use Topics   • Alcohol use: Not Currently     Comment: occasionally   • Drug use: Yes     Types: Methamphetamines     Comment:  "marijuana      Social History     Substance and Sexual Activity   Drug Use Yes   • Types: Methamphetamines    Comment: marijuana       FAMILY HISTORY  History reviewed. No pertinent family history.    CURRENT MEDICATIONS  Home Medications     Reviewed by Viri Quevedo R.N. (Registered Nurse) on 12/01/21 at 2022  Med List Status: Not Addressed   Medication Last Dose Status   albuterol 108 (90 Base) MCG/ACT Aero Soln inhalation aerosol  Active   DULoxetine (CYMBALTA) 20 MG Cap DR Particles  Active   Ibuprofen (MOTRIN PO)  Active   promethazine-dextromethorphan (PROMETHAZINE-DM) 6.25-15 MG/5ML syrup  Active   REXULTI 1 MG Tab  Active                ALLERGIES  Allergies   Allergen Reactions   • Penicillins Rash     Per pt       PHYSICAL EXAM  VITAL SIGNS: /81   Pulse 86   Temp 36.7 °C (98 °F) (Temporal)   Resp 18   Ht 1.575 m (5' 2\")   Wt 56.2 kg (123 lb 14.4 oz)   SpO2 96%   BMI 22.66 kg/m²     General: Alert, no acute distress  Skin: Warm, dry, normal for ethnicity  Head: Normocephalic, atraumatic  Neck: Trachea midline, no tenderness  Eye: PERRL, normal conjunctiva  ENMT: Oral mucosa moist, no pharyngeal erythema or exudate  Cardiovascular: Regular rate and rhythm, No murmur, Normal peripheral perfusion  Respiratory: Lungs demonstrate faint coarseness on expiration, bronchospastic cough appreciated, no rales, no rhonchi, no stridor, respirations are non-labored, breath sounds are equal  Musculoskeletal: No swelling, no deformity  Neurological: Alert and oriented to person, place, time, and situation  Lymphatics: No cervical lymphadenopathy  Psychiatric: Cooperative, appropriate mood & affect        RADIOLOGY  DX-CHEST-PORTABLE (1 VIEW)   Final Result         1.  No acute cardiopulmonary disease.        The radiologist's interpretation of all radiological studies have been reviewed by me.    COURSE & MEDICAL DECISION MAKING  Pertinent Labs & Imaging studies reviewed. (See chart for details)    10:23 PM - " "Patient seen and examined at bedside. Patient will be treated with Tessalon and prednisone. Ordered chest x-ray to evaluate her symptoms. The differential diagnoses include but are not limited to: Pneumonia, bronchitis, asthma exacerbation    Patient Vitals for the past 24 hrs:   BP Temp Temp src Pulse Resp SpO2 Height Weight   12/01/21 2248 126/81 36.7 °C (98 °F) Temporal 86 18 96 % -- --   12/01/21 2018 127/72 36.7 °C (98 °F) Temporal 96 20 100 % -- --   12/01/21 2016 -- -- -- -- -- -- 1.575 m (5' 2\") 56.2 kg (123 lb 14.4 oz)         Decision Making:  This is a 46 y.o. year old female who presents with persistent cough for the last 2 weeks.  She has a history of asthma and does smoke with a vape pen.  She has faint wheezing and coarse breath sounds noted on her exam but otherwise is well-appearing, no hypoxia, no evidence of any increased work of breathing.  Given she is been ill for 2 weeks x-rays obtained, thankfully no evidence of pneumonia.  She is recently tested negative for COVID-19 as well.  I suspect given the bronchospastic nature of her cough asthmatic bronchitis.  She already has albuterol, I will treat her with a course of steroids, started on dose of prednisone here in the ED.       The patient will return for new or worsening symptoms and is stable at the time of discharge.    Patient has had high blood pressure while in the emergency department, felt likely secondary to medical condition. Counseled patient to monitor blood pressure at home and follow up with primary care physician.     DISPOSITION:  Patient will be discharged home in stable condition.    FOLLOW UP:  Dale Ch P.A.-C.  [address]  [address]  [place] [id number] [phone]    Schedule an appointment as soon as possible for a visit in 3 days        OUTPATIENT MEDICATIONS:  Discharge Medication List as of 12/1/2021 10:48 PM      START taking these medications    Details   predniSONE (DELTASONE) 50 MG Tab Take 1 Tablet " by mouth every day for 5 days., Disp-5 Tablet, R-0, Normal      benzonatate (TESSALON) 100 MG Cap Take 1 Capsule by mouth 3 times a day as needed for Cough., Disp-60 Capsule, R-0, Normal               FINAL IMPRESSION  1. Acute bronchitis with bronchospasm          Calvin MOTT M.D. (Scribe), am scribing for, and in the presence of, Calvin Garcia MD.    Electronically signed by: Calvin Garcia M.D. (Scribe), 12/1/2021    ICalvin MD personally performed the services described in this documentation, as scribed by Calvin Garcia M.D. in my presence, and it is both accurate and complete    The note accurately reflects work and decisions made by me.  Calvin Garcia M.D.  12/1/2021  11:36 PM

## 2021-12-02 NOTE — ED TRIAGE NOTES
"Chief Complaint   Patient presents with   • Cough     Patient walk-in by self. Pt c/o dry cough x2 weeks, runny nose, fatigue. Pt went to urgent care on 11/23 & got tested for covid & was negative. Unvaccinated. Pt uses vape pen to smoke THC.     /72   Pulse 96   Temp 36.7 °C (98 °F) (Temporal)   Resp 20   Ht 1.575 m (5' 2\")   Wt 56.2 kg (123 lb 14.4 oz)   SpO2 100% RA    Has this patient been vaccinated for COVID NO  If not, would they like to be vaccinated while in the ER if eligible?  NO  Would the patient like to speak with the ERP about the possibility of receiving the COVID vaccine today before making a decision? NO    "

## 2022-06-20 ENCOUNTER — APPOINTMENT (OUTPATIENT)
Dept: RADIOLOGY | Facility: MEDICAL CENTER | Age: 47
End: 2022-06-20
Attending: EMERGENCY MEDICINE
Payer: COMMERCIAL

## 2022-06-20 ENCOUNTER — HOSPITAL ENCOUNTER (EMERGENCY)
Facility: MEDICAL CENTER | Age: 47
End: 2022-06-20
Attending: EMERGENCY MEDICINE
Payer: COMMERCIAL

## 2022-06-20 VITALS
DIASTOLIC BLOOD PRESSURE: 85 MMHG | RESPIRATION RATE: 14 BRPM | SYSTOLIC BLOOD PRESSURE: 129 MMHG | TEMPERATURE: 97.5 F | WEIGHT: 123.9 LBS | BODY MASS INDEX: 22.8 KG/M2 | HEART RATE: 83 BPM | HEIGHT: 62 IN | OXYGEN SATURATION: 98 %

## 2022-06-20 DIAGNOSIS — N20.0 NEPHROLITHIASIS: ICD-10-CM

## 2022-06-20 DIAGNOSIS — S22.42XA CLOSED FRACTURE OF MULTIPLE RIBS OF LEFT SIDE, INITIAL ENCOUNTER: ICD-10-CM

## 2022-06-20 DIAGNOSIS — R82.71 BACTERIURIA: ICD-10-CM

## 2022-06-20 LAB
APPEARANCE UR: ABNORMAL
BACTERIA #/AREA URNS HPF: ABNORMAL /HPF
BILIRUB UR QL STRIP.AUTO: NEGATIVE
COLOR UR: YELLOW
EPI CELLS #/AREA URNS HPF: ABNORMAL /HPF
GLUCOSE UR STRIP.AUTO-MCNC: NEGATIVE MG/DL
HCG UR QL: NEGATIVE
HYALINE CASTS #/AREA URNS LPF: ABNORMAL /LPF
KETONES UR STRIP.AUTO-MCNC: NEGATIVE MG/DL
LEUKOCYTE ESTERASE UR QL STRIP.AUTO: ABNORMAL
MICRO URNS: ABNORMAL
NITRITE UR QL STRIP.AUTO: NEGATIVE
PH UR STRIP.AUTO: 8 [PH] (ref 5–8)
PROT UR QL STRIP: NEGATIVE MG/DL
RBC # URNS HPF: ABNORMAL /HPF
RBC UR QL AUTO: NEGATIVE
SP GR UR STRIP.AUTO: 1.02
UROBILINOGEN UR STRIP.AUTO-MCNC: 1 MG/DL
WBC #/AREA URNS HPF: ABNORMAL /HPF

## 2022-06-20 PROCEDURE — 700111 HCHG RX REV CODE 636 W/ 250 OVERRIDE (IP): Performed by: EMERGENCY MEDICINE

## 2022-06-20 PROCEDURE — 74176 CT ABD & PELVIS W/O CONTRAST: CPT

## 2022-06-20 PROCEDURE — 87086 URINE CULTURE/COLONY COUNT: CPT

## 2022-06-20 PROCEDURE — 71101 X-RAY EXAM UNILAT RIBS/CHEST: CPT | Mod: LT

## 2022-06-20 PROCEDURE — 99284 EMERGENCY DEPT VISIT MOD MDM: CPT

## 2022-06-20 PROCEDURE — 87077 CULTURE AEROBIC IDENTIFY: CPT

## 2022-06-20 PROCEDURE — 87186 SC STD MICRODIL/AGAR DIL: CPT

## 2022-06-20 PROCEDURE — 96372 THER/PROPH/DIAG INJ SC/IM: CPT

## 2022-06-20 PROCEDURE — 81001 URINALYSIS AUTO W/SCOPE: CPT

## 2022-06-20 PROCEDURE — 81025 URINE PREGNANCY TEST: CPT

## 2022-06-20 RX ORDER — NITROFURANTOIN 25; 75 MG/1; MG/1
100 CAPSULE ORAL 2 TIMES DAILY
Qty: 14 CAPSULE | Refills: 0 | Status: SHIPPED | OUTPATIENT
Start: 2022-06-20 | End: 2022-06-27

## 2022-06-20 RX ORDER — NAPROXEN 500 MG/1
500 TABLET ORAL 2 TIMES DAILY WITH MEALS
Qty: 10 TABLET | Refills: 0 | Status: SHIPPED | OUTPATIENT
Start: 2022-06-20 | End: 2023-03-16

## 2022-06-20 RX ORDER — HYDROCODONE BITARTRATE AND ACETAMINOPHEN 5; 325 MG/1; MG/1
1-2 TABLET ORAL EVERY 4 HOURS PRN
Qty: 10 TABLET | Refills: 0 | Status: SHIPPED | OUTPATIENT
Start: 2022-06-20 | End: 2022-06-23

## 2022-06-20 RX ORDER — KETOROLAC TROMETHAMINE 30 MG/ML
30 INJECTION, SOLUTION INTRAMUSCULAR; INTRAVENOUS ONCE
Status: COMPLETED | OUTPATIENT
Start: 2022-06-20 | End: 2022-06-20

## 2022-06-20 RX ADMIN — KETOROLAC TROMETHAMINE 30 MG: 30 INJECTION, SOLUTION INTRAMUSCULAR; INTRAVENOUS at 07:18

## 2022-06-20 NOTE — ED NOTES
All discharge instructions given to pt as well as Rx and consent obtained.  Pt advised not to drink or drive.  Pt verbalized understanding of all discharge instructions.  All questions answered.

## 2022-06-20 NOTE — ED TRIAGE NOTES
"Chief Complaint   Patient presents with   • Cough   • Shortness of Breath   • Rib Pain     PT reports increasing SOB with cough and rib pain  in the back to the left side.  PT reports this has been going on x past 2 months     Blood Pressure 133/83   Pulse 60   Temperature (Abnormal) 35.6 °C (96 °F) (Temporal)   Respiration 18   Height 1.575 m (5' 2.01\")   Weight 56.2 kg (123 lb 14.4 oz)   Last Menstrual Period 04/22/2022   Oxygen Saturation 95% Comment: RA  Body Mass Index 22.66 kg/m²     "

## 2022-06-20 NOTE — DISCHARGE INSTRUCTIONS
Follow-up with primary care this week for reevaluation, medication management.    Macrobid twice daily for 7 days for urinary tract infection.  Naproxen twice daily as needed for pain for rib fracture.  Norco every 4-6 hours as needed for breakthrough pain.    Incentive spirometry as directed 10 times per hour while awake.    Activity as tolerated.    Return to the emergency department for persistent or worsening chest pain, shortness of breath, abdominal pain, fever, vomiting or other new concerns.

## 2022-06-20 NOTE — LETTER
6/23/2022               Vicky Cox  [facility] [address] [facility] [address]        Dear Vicky (MR#[id number])    This letter is sent in regards to your recent visit to the [facility] Emergency Department on 6/20/2022. During the visit, tests were performed to assist the physician in your medical diagnosis. A review of your tests requires that we notify you of the following:    Your urine culture was POSITIVE for a bacteria called Escherichia coli. The antibiotic prescribed for you (nitrofurantoin) should be active to treat this bacteria. A 5 day course is sufficient to treat this infection, you do not need 7 days. It is important that you complete the full 5 day course of this antibiotic.    Please feel free to contact me at the number below if you have any questions or concerns. Thank you for your cooperation in the matter.    Sincerely,  ED Culture Follow-Up Staff  Geronimo Tejada, PharmD    [facility], Emergency Department  [address] [place] [id number] [phone] (ED Culture Line)

## 2022-06-20 NOTE — ED NOTES
Pt awake and alert. Respirations unlabored, pt reports cough and Sob off and on for approx 2 months. Pt denies fall or injury, but reports left side/back pain. Vitals stable.

## 2022-06-20 NOTE — ED PROVIDER NOTES
"ED Provider Note    CHIEF COMPLAINT  Chief Complaint   Patient presents with   • Cough   • Shortness of Breath   • Rib Pain     PT reports increasing SOB with cough and rib pain  in the back to the left side.  PT reports this has been going on x past 2 months       HPI  Vicky Cox is a 46 y.o. female who presents to the emergency department for left-sided chest pain, rib pain and flank pain.  Patient states she has had a chronic cough.  She has broken a rib on the right side previously.  Believes she may have done so again.  Denies any other trauma or injury.  Denies palpitations or syncope.  Denies fever or chills.  Left-sided chest pain is now also left flank.  No vomiting or diarrhea.  No bloody stools.  Denies pregnancy, tubal ligation.  Denies abnormal vaginal bleeding or discharge.    REVIEW OF SYSTEMS  See HPI for further details. All other systems are negative.     PAST MEDICAL HISTORY   has a past medical history of Patient denies medical problems.    SOCIAL HISTORY  Social History     Tobacco Use   • Smoking status: Former Smoker     Quit date: 9/3/2017     Years since quittin.7   • Smokeless tobacco: Never Used   Vaping Use   • Vaping Use: Some days   • Substances: THC   Substance and Sexual Activity   • Alcohol use: Not Currently     Comment: occasionally   • Drug use: Yes     Types: Methamphetamines     Comment: marijuana   • Sexual activity: Not Currently     Partners: Male       SURGICAL HISTORY  patient denies any surgical history    CURRENT MEDICATIONS  Home Medications    **Home medications have not yet been reviewed for this encounter**         ALLERGIES  Allergies   Allergen Reactions   • Penicillins Rash     Per pt       PHYSICAL EXAM  VITAL SIGNS: /81   Pulse 85   Temp (!) 35.6 °C (96 °F) (Temporal)   Resp 20   Ht 1.575 m (5' 2.01\")   Wt 56.2 kg (123 lb 14.4 oz)   LMP 2022   SpO2 97%   BMI 22.66 kg/m²   Pulse ox interpretation: I interpret this pulse ox as " normal.  Constitutional: Alert in no apparent distress.  HENT: Normocephalic, atraumatic. Bilateral external ears normal, Nose normal.   Eyes: Pupils are equal and reactive, Conjunctiva normal.   Neck: Normal range of motion, Supple  Lymphatic: No lymphadenopathy noted.   Cardiovascular: Regular rate and rhythm, no murmurs. Distal pulses intact.   Thorax & Lungs: Normal breath sounds.  No wheezing/rales/ronchi. No increased work of breathing, clipped speech or retractions.  Tenderness to palpation left posterior ribs inferiorly.  Extends over the flank without cutaneous changes, hematoma, rash.  Abdomen: Soft, non-distended.  Mild discomfort in the left lower quadrant without rebound, guarding or peritonitis.  No palpable or pulsatile masses. No peritoneal signs.  No CVA tenderness percussion.  Skin: Warm, Dry, No erythema, No rash.   Musculoskeletal: Good range of motion in all major joints. No major deformities noted.   Neurologic: Alert and orient x4.  Moves her extremity spontaneously.  Psychiatric: Odd affect.  Cooperative.    DIAGNOSTIC STUDIES / PROCEDURES    LABS  Results for orders placed or performed during the hospital encounter of 06/20/22   URINALYSIS,CULTURE IF INDICATED    Specimen: Urine   Result Value Ref Range    Color Yellow     Character Turbid (A)     Specific Gravity 1.016 <1.035    Ph 8.0 5.0 - 8.0    Glucose Negative Negative mg/dL    Ketones Negative Negative mg/dL    Protein Negative Negative mg/dL    Bilirubin Negative Negative    Urobilinogen, Urine 1.0 Negative    Nitrite Negative Negative    Leukocyte Esterase Small (A) Negative    Occult Blood Negative Negative    Micro Urine Req Microscopic    BETA-HCG QUALITATIVE URINE   Result Value Ref Range    Beta-Hcg Urine Negative Negative   URINE MICROSCOPIC (W/UA)   Result Value Ref Range    WBC 10-20 (A) /hpf    RBC 0-2 /hpf    Bacteria Few (A) None /hpf    Epithelial Cells Moderate (A) /hpf    Hyaline Cast 6-10 (A) /lpf      RADIOLOGY  CT-ABDOMEN-PELVIS W/O   Final Result      1.  No hydronephrosis   2.  Tiny LEFT calyceal stone without evidence of lower tract stone      [id number] (WITH 1-VIEW CXR) LEFT   Final Result      Fractures of the LEFT 10th and 11th ribs              COURSE & MEDICAL DECISION MAKING  Nursing notes and vital signs were reviewed. (See chart for details)  The patients records were reviewed, history was obtained from the patient;     ED evaluation for left posterior chest/flank pain may be multifactorial, but most suggestive of pain from the identified left 10th and 11th rib fractures.  Patient adamantly denies trauma or injury and believes this is from cough, states this is happened before.  There is no physical clinical evidence for trauma with hematoma or ecchymosis otherwise.  No effusion to suggest hemothorax, no pneumothorax.  Additionally, patient with left flank pain, left lower quadrant abdominal pain, urine does appear to have infection with small leukocyte Estrace, WBCs and bacteria.  She will be treated empirically given her symptomatology with Macrobid for 7 days.  No clinical evidence for pyelonephritis or sepsis.  CT does demonstrate small calyceal stone without evidence for ureteral stone or obstruction, unlikely causing her pain.  She is hemodynamically stable without fever, tachycardia, hypotension.  Tolerates oral medications, pain controlled with ED intervention.    Patient is stable for discharge at this time, anticipatory guidance provided, naproxen for discomfort, Norco for breakthrough pain, Macrobid for 7 days, close follow-up is encouraged, and strict ED return instructions have been detailed. Patient is agreeable to the disposition and plan.     reviewed, no pattern for concern.  In prescribing controlled substances to this patient, I certify that I have obtained and reviewed the medical history of Vicky Cox. I have also made a good maulik effort to obtain  applicable records from other providers who have treated the patient and records did not demonstrate any increased risk of substance abuse that would prevent me from prescribing controlled substances.     I have conducted a physical exam and documented it. I have reviewed Ms. Cox’s prescription history as maintained by the [place] Prescription Monitoring Program.     I have assessed the patient’s risk for abuse, dependency, and addiction using the validated Opioid Risk Tool available at https://www.mdcalc.com/ynaaqr-gwpk-bcwr-ort-narcotic-abuse.     Given the above, I believe the benefits of controlled substance therapy outweigh the risks. The reasons for prescribing controlled substances include non-narcotic, oral analgesic alternatives have been inadequate for pain control. Accordingly, I have discussed the risk and benefits, treatment plan, and alternative therapies with the patient.     FINAL IMPRESSION  (S22.42XA) Closed fracture of multiple ribs of left side, initial encounter  (R82.71) Bacteriuria  (N20.0) Nephrolithiasis      Electronically signed by: Loyda Shah D.O., 6/20/2022 8:17 AM      This dictation was created using voice recognition software. The accuracy of the dictation is limited to the abilities of the software. I expect there may be some errors of grammar and possibly content. The nursing notes were reviewed and certain aspects of this information were incorporated into this note.

## 2022-06-22 LAB
BACTERIA UR CULT: ABNORMAL
BACTERIA UR CULT: ABNORMAL
SIGNIFICANT IND 70042: ABNORMAL
SITE SITE: ABNORMAL
SOURCE SOURCE: ABNORMAL

## 2022-06-24 NOTE — ED NOTES
ED Positive Culture Follow-up/Notification Note:    Date: 6/23/2022     Patient seen in the ED on 6/20/2022 for rib and flank pain.   1. Closed fracture of multiple ribs of left side, initial encounter    2. Bacteriuria    3. Nephrolithiasis       Discharge Medication List as of 6/20/2022  8:19 AM      START taking these medications    Details   nitrofurantoin (MACROBID) 100 MG Cap Take 1 Capsule by mouth 2 times a day for 7 days., Disp-14 Capsule, R-0, Normal      naproxen (NAPROSYN) 500 MG Tab Take 1 Tablet by mouth 2 times a day with meals., Disp-10 Tablet, R-0, Normal      HYDROcodone-acetaminophen (NORCO) 5-325 MG Tab per tablet Take 1-2 Tablets by mouth every four hours as needed (pain) for up to 3 days., Disp-10 Tablet, R-0, Normal             Allergies: Penicillins     Vitals:    06/20/22 0559 06/20/22 0600 06/20/22 0700 06/20/22 0730   BP: (!) 141/90 (!) 119/91 118/81 129/85   Pulse: 85 91 85 83   Resp: 20 20 14   Temp:    36.4 °C (97.5 °F)   TempSrc:    Temporal   SpO2: 98% 99% 97% 98%   Weight:       Height:           Final cultures:   Results     Procedure Component Value Units Date/Time    URINE CULTURE(NEW) [[id number]]  (Abnormal)  (Susceptibility) Collected: 06/20/22 0645    Order Status: Completed Specimen: Urine Updated: 06/22/22 0928     Significant Indicator POS     Source UR     Site -     Culture Result -      Escherichia coli  10-50,000 cfu/mL      Narrative:      Indication for culture:->Patient WITHOUT an indwelling Sierra  catheter in place with new onset of Dysuria, Frequency,  Urgency, and/or Suprapubic pain    Susceptibility     Escherichia coli (1)     Antibiotic Interpretation Microscan   Method Status    Amikacin  [*]  Sensitive <=16 mcg/mL AUNDREA Final    Ampicillin Resistant >16 mcg/mL AUNDREA Final    Amoxicillin/CA  [*]  Sensitive <=8/4 mcg/mL AUNDREA Final    Aztreonam  [*]  Sensitive <=4 mcg/mL AUNDREA Final    Ceftolozane+Tazobactam  [*]  Sensitive <=2 mcg/mL AUNDREA Final    Ceftriaxone Sensitive  <=1 mcg/mL AUNDREA Final    Ceftazidime  [*]  Sensitive <=1 mcg/mL AUNDREA Final    Cefazolin Sensitive <=2 mcg/mL AUNDREA Final     Breakpoints when Cefazolin is used for therapy of infections  other than uncomplicated UTIs due to Enterobacterales are as  follows:  AUNDREA and Interpretation:  <=2 S  4 I  >=8 R         Ciprofloxacin Sensitive <=0.25 mcg/mL AUNDREA Final    Cefepime Sensitive <=2 mcg/mL AUNDREA Final    Cefuroxime Sensitive <=4 mcg/mL AUNDREA Final    Ceftazidime+Avibactam  [*]  Sensitive <=4 mcg/mL AUNDREA Final    Ampicillin/sulbactam Intermediate 16/8 mcg/mL AUNDREA Final    Ertapenem  [*]  Sensitive <=0.5 mcg/mL AUNDREA Final    Tobramycin Sensitive <=2 mcg/mL AUNDREA Final    Nitrofurantoin Sensitive <=32 mcg/mL AUNDREA Final    Gentamicin Sensitive <=2 mcg/mL AUNDREA Final    Imipenem  [*]  Sensitive <=1 mcg/mL AUNDREA Final    Levofloxacin Sensitive <=0.5 mcg/mL AUNDREA Final    Meropenem  [*]  Sensitive <=1 mcg/mL AUNDREA Final    Meropenem/Vaborbactam  [*]  Sensitive <=2 mcg/mL AUNDREA Final    Minocycline Sensitive <=4 mcg/mL AUNDREA Final    Pip/Tazobactam Sensitive <=8 mcg/mL AUNDREA Final    Trimeth/Sulfa Resistant >2/38 mcg/mL AUNDREA Final    Tetracycline  [*]  Resistant >8 mcg/mL AUNDREA Final    Tigecycline Sensitive <=2 mcg/mL AUNDREA Final           [*]  Suppressed Antibiotic                 URINALYSIS,CULTURE IF INDICATED [[id number]]  (Abnormal) Collected: 06/20/22 0645    Order Status: Completed Specimen: Urine Updated: 06/20/22 0657     Color Yellow     Character Turbid     Specific Gravity 1.016     Ph 8.0     Glucose Negative mg/dL      Ketones Negative mg/dL      Protein Negative mg/dL      Bilirubin Negative     Urobilinogen, Urine 1.0     Nitrite Negative     Leukocyte Esterase Small     Occult Blood Negative     Micro Urine Req Microscopic    Narrative:      Indication for culture:->Patient WITHOUT an indwelling Sierra  catheter in place with new onset of Dysuria, Frequency,  Urgency, and/or Suprapubic pain          Plan:   Appropriate antibiotic therapy  prescribed. Antibiotic duration reduced to 5 days instead of 7 days.     Sent letter to patient to notify of positive culture result and encourage compliance with prescribed antibiotics.     Geronimo Tejada, PharmD

## 2023-02-23 ENCOUNTER — HOSPITAL ENCOUNTER (EMERGENCY)
Facility: MEDICAL CENTER | Age: 48
End: 2023-02-23
Attending: EMERGENCY MEDICINE
Payer: COMMERCIAL

## 2023-02-23 ENCOUNTER — HOSPITAL ENCOUNTER (EMERGENCY)
Facility: MEDICAL CENTER | Age: 48
End: 2023-02-23
Payer: COMMERCIAL

## 2023-02-23 ENCOUNTER — APPOINTMENT (OUTPATIENT)
Dept: RADIOLOGY | Facility: MEDICAL CENTER | Age: 48
End: 2023-02-23
Attending: EMERGENCY MEDICINE
Payer: COMMERCIAL

## 2023-02-23 VITALS
DIASTOLIC BLOOD PRESSURE: 77 MMHG | OXYGEN SATURATION: 98 % | SYSTOLIC BLOOD PRESSURE: 127 MMHG | RESPIRATION RATE: 20 BRPM | BODY MASS INDEX: 22.08 KG/M2 | WEIGHT: 120 LBS | TEMPERATURE: 98 F | HEIGHT: 62 IN | HEART RATE: 97 BPM

## 2023-02-23 VITALS — BODY MASS INDEX: 22.08 KG/M2 | HEIGHT: 62 IN | WEIGHT: 120 LBS

## 2023-02-23 DIAGNOSIS — N39.0 URINARY TRACT INFECTION WITHOUT HEMATURIA, SITE UNSPECIFIED: ICD-10-CM

## 2023-02-23 DIAGNOSIS — N12 PYELONEPHRITIS: ICD-10-CM

## 2023-02-23 LAB
ALBUMIN SERPL BCP-MCNC: 4 G/DL (ref 3.2–4.9)
ALBUMIN/GLOB SERPL: 1.3 G/DL
ALP SERPL-CCNC: 67 U/L (ref 30–99)
ALT SERPL-CCNC: 5 U/L (ref 2–50)
ANION GAP SERPL CALC-SCNC: 11 MMOL/L (ref 7–16)
APPEARANCE UR: ABNORMAL
AST SERPL-CCNC: 10 U/L (ref 12–45)
BACTERIA #/AREA URNS HPF: ABNORMAL /HPF
BASOPHILS # BLD AUTO: 0.5 % (ref 0–1.8)
BASOPHILS # BLD: 0.04 K/UL (ref 0–0.12)
BILIRUB SERPL-MCNC: 0.3 MG/DL (ref 0.1–1.5)
BILIRUB UR QL STRIP.AUTO: NEGATIVE
BUN SERPL-MCNC: 8 MG/DL (ref 8–22)
CALCIUM ALBUM COR SERPL-MCNC: 9.5 MG/DL (ref 8.5–10.5)
CALCIUM SERPL-MCNC: 9.5 MG/DL (ref 8.5–10.5)
CHLORIDE SERPL-SCNC: 104 MMOL/L (ref 96–112)
CO2 SERPL-SCNC: 23 MMOL/L (ref 20–33)
COLOR UR: YELLOW
CREAT SERPL-MCNC: 0.48 MG/DL (ref 0.5–1.4)
EOSINOPHIL # BLD AUTO: 0.22 K/UL (ref 0–0.51)
EOSINOPHIL NFR BLD: 2.7 % (ref 0–6.9)
EPI CELLS #/AREA URNS HPF: ABNORMAL /HPF
ERYTHROCYTE [DISTWIDTH] IN BLOOD BY AUTOMATED COUNT: 42 FL (ref 35.9–50)
GFR SERPLBLD CREATININE-BSD FMLA CKD-EPI: 119 ML/MIN/1.73 M 2
GLOBULIN SER CALC-MCNC: 3 G/DL (ref 1.9–3.5)
GLUCOSE SERPL-MCNC: 113 MG/DL (ref 65–99)
GLUCOSE UR STRIP.AUTO-MCNC: NEGATIVE MG/DL
HCG SERPL QL: NEGATIVE
HCT VFR BLD AUTO: 42.7 % (ref 37–47)
HGB BLD-MCNC: 14.4 G/DL (ref 12–16)
HYALINE CASTS #/AREA URNS LPF: ABNORMAL /LPF
IMM GRANULOCYTES # BLD AUTO: 0.04 K/UL (ref 0–0.11)
IMM GRANULOCYTES NFR BLD AUTO: 0.5 % (ref 0–0.9)
KETONES UR STRIP.AUTO-MCNC: ABNORMAL MG/DL
LEUKOCYTE ESTERASE UR QL STRIP.AUTO: ABNORMAL
LIPASE SERPL-CCNC: 21 U/L (ref 11–82)
LYMPHOCYTES # BLD AUTO: 2.13 K/UL (ref 1–4.8)
LYMPHOCYTES NFR BLD: 25.7 % (ref 22–41)
MCH RBC QN AUTO: 29.9 PG (ref 27–33)
MCHC RBC AUTO-ENTMCNC: 33.7 G/DL (ref 33.6–35)
MCV RBC AUTO: 88.8 FL (ref 81.4–97.8)
MICRO URNS: ABNORMAL
MONOCYTES # BLD AUTO: 0.67 K/UL (ref 0–0.85)
MONOCYTES NFR BLD AUTO: 8.1 % (ref 0–13.4)
NEUTROPHILS # BLD AUTO: 5.19 K/UL (ref 2–7.15)
NEUTROPHILS NFR BLD: 62.5 % (ref 44–72)
NITRITE UR QL STRIP.AUTO: POSITIVE
NRBC # BLD AUTO: 0 K/UL
NRBC BLD-RTO: 0 /100 WBC
PH UR STRIP.AUTO: 6.5 [PH] (ref 5–8)
PLATELET # BLD AUTO: 328 K/UL (ref 164–446)
PMV BLD AUTO: 10.3 FL (ref 9–12.9)
POTASSIUM SERPL-SCNC: 3.5 MMOL/L (ref 3.6–5.5)
PROT SERPL-MCNC: 7 G/DL (ref 6–8.2)
PROT UR QL STRIP: NEGATIVE MG/DL
RBC # BLD AUTO: 4.81 M/UL (ref 4.2–5.4)
RBC # URNS HPF: ABNORMAL /HPF
RBC UR QL AUTO: NEGATIVE
SODIUM SERPL-SCNC: 138 MMOL/L (ref 135–145)
SP GR UR STRIP.AUTO: 1.02
UROBILINOGEN UR STRIP.AUTO-MCNC: 0.2 MG/DL
WBC # BLD AUTO: 8.3 K/UL (ref 4.8–10.8)
WBC #/AREA URNS HPF: ABNORMAL /HPF

## 2023-02-23 PROCEDURE — A9270 NON-COVERED ITEM OR SERVICE: HCPCS | Performed by: EMERGENCY MEDICINE

## 2023-02-23 PROCEDURE — 80053 COMPREHEN METABOLIC PANEL: CPT

## 2023-02-23 PROCEDURE — 81001 URINALYSIS AUTO W/SCOPE: CPT

## 2023-02-23 PROCEDURE — 99285 EMERGENCY DEPT VISIT HI MDM: CPT

## 2023-02-23 PROCEDURE — 83690 ASSAY OF LIPASE: CPT

## 2023-02-23 PROCEDURE — 74176 CT ABD & PELVIS W/O CONTRAST: CPT

## 2023-02-23 PROCEDURE — 36415 COLL VENOUS BLD VENIPUNCTURE: CPT

## 2023-02-23 PROCEDURE — 302449 STATCHG TRIAGE ONLY (STATISTIC)

## 2023-02-23 PROCEDURE — 700111 HCHG RX REV CODE 636 W/ 250 OVERRIDE (IP): Performed by: EMERGENCY MEDICINE

## 2023-02-23 PROCEDURE — 87186 SC STD MICRODIL/AGAR DIL: CPT

## 2023-02-23 PROCEDURE — 87077 CULTURE AEROBIC IDENTIFY: CPT

## 2023-02-23 PROCEDURE — 96375 TX/PRO/DX INJ NEW DRUG ADDON: CPT

## 2023-02-23 PROCEDURE — 87086 URINE CULTURE/COLONY COUNT: CPT

## 2023-02-23 PROCEDURE — 85025 COMPLETE CBC W/AUTO DIFF WBC: CPT

## 2023-02-23 PROCEDURE — 84703 CHORIONIC GONADOTROPIN ASSAY: CPT

## 2023-02-23 PROCEDURE — 700102 HCHG RX REV CODE 250 W/ 637 OVERRIDE(OP): Performed by: EMERGENCY MEDICINE

## 2023-02-23 PROCEDURE — 96374 THER/PROPH/DIAG INJ IV PUSH: CPT

## 2023-02-23 RX ORDER — KETOROLAC TROMETHAMINE 30 MG/ML
30 INJECTION, SOLUTION INTRAMUSCULAR; INTRAVENOUS ONCE
Status: COMPLETED | OUTPATIENT
Start: 2023-02-23 | End: 2023-02-23

## 2023-02-23 RX ORDER — ONDANSETRON 2 MG/ML
4 INJECTION INTRAMUSCULAR; INTRAVENOUS ONCE
Status: COMPLETED | OUTPATIENT
Start: 2023-02-23 | End: 2023-02-23

## 2023-02-23 RX ORDER — CEFDINIR 300 MG/1
300 CAPSULE ORAL ONCE
Status: COMPLETED | OUTPATIENT
Start: 2023-02-23 | End: 2023-02-23

## 2023-02-23 RX ORDER — CEFDINIR 300 MG/1
300 CAPSULE ORAL 2 TIMES DAILY
Qty: 14 CAPSULE | Refills: 0 | Status: ACTIVE | OUTPATIENT
Start: 2023-02-23 | End: 2023-02-27 | Stop reason: SDUPTHER

## 2023-02-23 RX ADMIN — CEFDINIR 300 MG: 300 CAPSULE ORAL at 11:31

## 2023-02-23 RX ADMIN — ONDANSETRON 4 MG: 2 INJECTION INTRAMUSCULAR; INTRAVENOUS at 10:20

## 2023-02-23 RX ADMIN — KETOROLAC TROMETHAMINE 30 MG: 30 INJECTION, SOLUTION INTRAMUSCULAR at 10:30

## 2023-02-23 ASSESSMENT — LIFESTYLE VARIABLES: DO YOU DRINK ALCOHOL: NO

## 2023-02-23 ASSESSMENT — PAIN DESCRIPTION - DESCRIPTORS
DESCRIPTORS: ACHING
DESCRIPTORS: ACHING

## 2023-02-23 ASSESSMENT — PAIN DESCRIPTION - PAIN TYPE: TYPE: ACUTE PAIN

## 2023-02-23 NOTE — DISCHARGE INSTRUCTIONS
Return to the emergency department in 24 hours for any persistent abdominal pain.  Return to the emergency department immediately for worsening abdominal pain, fever, vomiting or other new concerns.  Otherwise, follow-up with primary care as scheduled next week for reevaluation.    Omnicef twice daily for 7 days for urinary tract infection/pyelonephritis.  Tylenol or ibuprofen, alternating if needed, as needed for fever or discomfort.    Encourage oral fluid hydration.  Diet and activity as tolerated.

## 2023-02-23 NOTE — LETTER
3/1/2023               Vicky Cox  [facility] [address] [facility] [address]        Dear Vicky (MR#[id number])    This letter is sent in regards to your recent visit to the [facility] Emergency Department on 2/23/2023. During the visit, tests were performed to assist the physician in your medical diagnosis. A review of your tests requires that we notify you of the following:    Your urine culture was POSITIVE for a bacteria called Escherichia coli. The antibiotic prescribed for you (cefdinir) should be active to treat this bacteria. It is important that you continue taking your antibiotic until it is finished.     Please feel free to contact me at the number below if you have any questions or concerns. Thank you for your cooperation in the matter.    Sincerely,  ED Culture Follow-Up Staff  Karly Talley, PharmD    [facility], Emergency Department  [address] [place] [id number] [phone] (ED Culture Line)

## 2023-02-23 NOTE — ED TRIAGE NOTES
BIB by EMS c/o RLQ pain that started 3 hrs ago. Pt reports nausea.    Pt denies fever,chills, vomiting, cp, sob. Denies any recent injury.    Alert and oriented X 3.

## 2023-02-23 NOTE — ED PROVIDER NOTES
"ED Provider Note    CHIEF COMPLAINT  Chief Complaint   Patient presents with    RLQ Pain       EXTERNAL RECORDS REVIEWED  Outpatient Notes previous ED visit 2022 for cough, shortness of breath, rib pain.  Work-up consistent with closed fracture of multiple ribs of left side, bacteriuria, nephrolithiasis.    HPI/ROS  LIMITATION TO HISTORY   Select: Poor historian  OUTSIDE HISTORIAN(S):  None    Vicky Cox is a 47 y.o. female who presents to the emergency department by ambulance for right-sided abdominal pain.  Patient describes sudden onset overnight.  Nausea without vomiting.  No diarrhea.  Normal bowel movement yesterday.  No dysuria, hematuria or frequency.    Sharp, almost cramping right lower quadrant abdominal pain, radiates to the right flank.  Denies history of the same.  Denies trauma.  Denies sick contacts or travel.    PAST MEDICAL HISTORY   has a past medical history of Patient denies medical problems.    SURGICAL HISTORY  patient denies any surgical history    FAMILY HISTORY  No family history on file.    SOCIAL HISTORY  Social History     Tobacco Use    Smoking status: Former     Types: Cigarettes     Quit date: 9/3/2017     Years since quittin.4    Smokeless tobacco: Never   Vaping Use    Vaping Use: Some days    Substances: THC   Substance and Sexual Activity    Alcohol use: Not Currently     Comment: occasionally    Drug use: Yes     Types: Methamphetamines     Comment: marijuana    Sexual activity: Not Currently     Partners: Male       CURRENT MEDICATIONS  Home Medications    **Home medications have not yet been reviewed for this encounter**         ALLERGIES  Allergies   Allergen Reactions    Penicillins Rash     Per pt       PHYSICAL EXAM  VITAL SIGNS: /77   Pulse 97   Temp 36.7 °C (98 °F) (Skin)   Resp 20   Ht 1.575 m (5' 2\")   Wt 54.4 kg (120 lb)   SpO2 98%   BMI 21.95 kg/m²    Pulse ox interpretation: I interpret this pulse ox as normal.  Constitutional: Alert " in no apparent distress.  Disheveled.  HENT: Normocephalic, atraumatic. Bilateral external ears normal, Nose normal.   Eyes: Pupils are equal and reactive, Conjunctiva normal.   Neck: Normal range of motion, Supple  Lymphatic: No lymphadenopathy noted.   Cardiovascular: Regular rate and rhythm, no murmurs. Distal pulses intact.    Thorax & Lungs: Normal breath sounds.  No wheezing/rales/ronchi. No increased work of breathing  Abdomen: Soft, non-distende right mid lower quadrant tenderness without guarding or peritonitis.  No palpable mass or hernia.  No lymphadenopathy.   No palpable or pulsatile masses. No peritoneal signs.  Right greater than left CVA tenderness percussion.  Skin: Warm, Dry, No erythema, No rash.   Musculoskeletal: Good range of motion in all major joints.   Neurologic: Alert and orient x4.  Moves 4 extremity spontaneously.  Psychiatric: Anxious, odd affect, cooperative    DIAGNOSTIC STUDIES / PROCEDURES  LABS      RADIOLOGY  I have independently interpreted the diagnostic imaging associated with this visit and am waiting the final reading from the radiologist.     Radiologist interpretation:   CT-RENAL COLIC EVALUATION(A/P W/O)   Final Result      1.  No evidence of nephroureterolithiasis or obstructive uropathy.   2.  Incompletely visualized small tubular structure emanating from the cecal base likely representing the appendix. The diameter measures 3 mm which is within normal limits for the appendix. The appendiceal tip is not visualized and thus acute    appendicitis cannot entirely be excluded.          COURSE & MEDICAL DECISION MAKING    ED Observation Status? No; Patient does not meet criteria for ED Observation.     INITIAL ASSESSMENT, COURSE AND PLAN  Care Narrative:   Evaluated at bedside.  Labs per protocol for abdominal pain initiated prior to my evaluation.  No leukocytosis, left shift or bandemia.  No electrolyte derangement.  Urinalysis is positive for nitrate and moderate leukocyte  Estrace.  Patient complaining of right mid and lower quadrant abdominal pain, rating to the right flank.  Add CT to exclude ureteral colic, nephrolithiasis on previous CT.  Exam less consistent with appendicitis given acute onset and severity of symptoms with CT she will evaluate for this as well.  Hemodynamically stable without fever, tachycardia, hypotension or hypoxia.    CT is unrevealing.  No ureteral calculus, hydronephrosis.  No other free fluid.  No bowel obstruction.  Appendix, partially visualized but appears normal.  Patient's symptoms much improved after Toradol.  She declined fentanyl.  She will be treated for UTI, pyelonephritis with Rocephin and then Omnicef.    Patient refused Rocephin, prefer discharge more quickly, oral Omnicef in the emergency department before discharge.  We will continue Omnicef for 7 days.  Pelvic exam was recommended for ongoing abdominal pain.  She refuses at this time but return for ongoing symptoms at which time she is aware that pelvic exam or repeat CT for possible early appendicitis may be indicated.      DISPOSITION AND DISCUSSIONS    ED evaluation for right lower quadrant, right flank pain most consistent with UTI, pyelonephritis.  Abdominal exam is tender but without guarding or peritonitis.  Symptoms improved after Toradol.  Omnicef before discharge.  No radiographic evidence for ureteral colic, hydronephrosis, appendicitis or bowel obstruction.  Labs are unremarkable.  Hemodynamically stable.  Acute appendicitis not entirely excluded but seems less likely at this time.  Patient return for worsening or unchanged symptoms.  PID not entirely excluded, pelvic exam declined, she is aware that if symptoms persist    Escalation of care considered, and ultimately not performed:acute inpatient care management, however at this time, the patient is most appropriate for outpatient management    Decision tools and prescription drugs considered including, but not limited to: Pain  Medications Tylenol or ibuprofen for discomfort.  If pain persists return ED evaluation is encouraged. .  Such examination and specimen sampling may be necessary.    Patient is stable for discharge home, anticipatory guidance provided, Omnicef for 7 days, Tylenol or ibuprofen for discomfort, close follow-up encouraged and strict ED return instructions have been detailed.  She is aware of the findings and agreeable to the disposition and plan.    FINAL DIAGNOSIS  1. Urinary tract infection without hematuria, site unspecified    2. Pyelonephritis           Electronically signed by: Loyda Shah D.O., 2/23/2023 1:08 PM

## 2023-02-27 RX ORDER — CEFDINIR 300 MG/1
300 CAPSULE ORAL 2 TIMES DAILY
Qty: 14 CAPSULE | Refills: 0 | Status: ACTIVE | OUTPATIENT
Start: 2023-02-27 | End: 2023-03-06

## 2023-02-28 NOTE — DISCHARGE PLANNING
Pt called stating the pharmacy that her script eas sent to does not have her abx requesting Rx be sent to [facility] [address] by Dr Arce.

## 2023-03-01 NOTE — ED NOTES
ED Positive Culture Follow-up/Notification Note:    Date: 3/1/23     Patient seen in the ED on 2/23/2023 for right sided abdominal pain and nausea.   1. Urinary tract infection without hematuria, site unspecified    2. Pyelonephritis       Discharge Medication List as of 2/23/2023 11:35 AM        START taking these medications    Details   cefdinir (OMNICEF) 300 MG Cap Take 1 Capsule by mouth 2 times a day for 7 days., Disp-14 Capsule, R-0, Normal             Allergies: Penicillins     Vitals:    02/23/23 1001 02/23/23 1017 02/23/23 1018 02/23/23 1114   BP: (!) 144/87   127/77   Pulse: (!) 105 98 100 97   Resp:   20 20   Temp:    36.7 °C (98 °F)   TempSrc:    Skin   SpO2: 98% 97% 97% 98%   Weight:       Height:           Final cultures:   Results       Procedure Component Value Units Date/Time    URINE CULTURE(NEW) [[id number]]  (Abnormal)  (Susceptibility) Collected: 02/23/23 0908    Order Status: Completed Specimen: Urine Updated: 02/25/23 1029     Significant Indicator POS     Source UR     Site -     Culture Result Usual urogenital bonny ,000 cfu/mL      Escherichia coli  >100,000 cfu/mL      Narrative:      Indication for culture:->Patient WITHOUT an indwelling Sierra  catheter in place with new onset of Dysuria, Frequency,  Urgency, and/or Suprapubic pain    Susceptibility       Escherichia coli (1)       Antibiotic Interpretation Microscan   Method Status    Amikacin  [*]  Sensitive <=16 mcg/mL AUNDREA Final    Ampicillin Resistant >16 mcg/mL AUNDREA Final    Amoxicillin/CA  [*]  Sensitive <=8/4 mcg/mL AUNDREA Final    Aztreonam  [*]  Sensitive <=4 mcg/mL AUNDREA Final    Ceftolozane+Tazobactam  [*]  Sensitive <=2 mcg/mL AUNDREA Final    Ceftriaxone Sensitive <=1 mcg/mL AUNDREA Final    Ceftazidime  [*]  Sensitive <=1 mcg/mL AUNDREA Final    Cefazolin Sensitive 4 mcg/mL AUNDREA Final     Breakpoints when Cefazolin is used for therapy of infections  other than uncomplicated UTIs due to Enterobacterales are as  follows:  AUNDREA and  Interpretation:  <=2 S  4 I  >=8 R         Ciprofloxacin Sensitive <=0.25 mcg/mL AUNDREA Final    Cefepime Sensitive <=2 mcg/mL AUNDREA Final    Cefuroxime Sensitive <=4 mcg/mL AUNDREA Final    Ceftazidime+Avibactam  [*]  Sensitive <=4 mcg/mL AUNDREA Final    Ampicillin/sulbactam Resistant >16/8 mcg/mL AUNDREA Final    Ertapenem  [*]  Sensitive <=0.5 mcg/mL AUNDREA Final    Tobramycin Sensitive <=2 mcg/mL AUNDREA Final    Nitrofurantoin Sensitive <=32 mcg/mL AUNDREA Final    Gentamicin Sensitive <=2 mcg/mL AUNDREA Final    Imipenem  [*]  Sensitive <=1 mcg/mL AUNDREA Final    Levofloxacin Sensitive <=0.5 mcg/mL AUNDREA Final    Meropenem  [*]  Sensitive <=1 mcg/mL AUNDREA Final    Meropenem/Vaborbactam  [*]  Sensitive <=2 mcg/mL AUNDREA Final    Minocycline Sensitive <=4 mcg/mL AUNDREA Final    Pip/Tazobactam Sensitive <=8 mcg/mL AUNDREA Final    Trimeth/Sulfa Resistant >2/38 mcg/mL AUNDREA Final    Tetracycline  [*]  Sensitive <=4 mcg/mL AUNDREA Final    Tigecycline Sensitive <=2 mcg/mL AUNDREA Final               [*]  Suppressed Antibiotic                   URINALYSIS,CULTURE IF INDICATED [[id number]]  (Abnormal) Collected: 02/23/23 0908    Order Status: Completed Specimen: Urine Updated: 02/23/23 0943     Color Yellow     Character Cloudy     Specific Gravity 1.016     Ph 6.5     Glucose Negative mg/dL      Ketones Trace mg/dL      Protein Negative mg/dL      Bilirubin Negative     Urobilinogen, Urine 0.2     Nitrite Positive     Leukocyte Esterase Moderate     Occult Blood Negative     Micro Urine Req Microscopic    Narrative:      Indication for culture:->Patient WITHOUT an indwelling Sierra  catheter in place with new onset of Dysuria, Frequency,  Urgency, and/or Suprapubic pain            Plan:   Appropriate antibiotic therapy prescribed. No changes required based upon culture result.  Sent letter to patient in my chart to notify of positive culture result and encourage compliance with prescribed antibiotics.     Karly Talley, PharmD

## 2023-03-16 ENCOUNTER — OFFICE VISIT (OUTPATIENT)
Dept: URGENT CARE | Facility: CLINIC | Age: 48
End: 2023-03-16
Payer: COMMERCIAL

## 2023-03-16 VITALS
HEART RATE: 104 BPM | TEMPERATURE: 97.2 F | OXYGEN SATURATION: 100 % | HEIGHT: 62 IN | BODY MASS INDEX: 18.58 KG/M2 | SYSTOLIC BLOOD PRESSURE: 108 MMHG | WEIGHT: 101 LBS | DIASTOLIC BLOOD PRESSURE: 74 MMHG | RESPIRATION RATE: 16 BRPM

## 2023-03-16 DIAGNOSIS — N30.00 ACUTE CYSTITIS WITHOUT HEMATURIA: ICD-10-CM

## 2023-03-16 PROCEDURE — 99213 OFFICE O/P EST LOW 20 MIN: CPT | Performed by: FAMILY MEDICINE

## 2023-03-16 RX ORDER — CEFDINIR 300 MG/1
300 CAPSULE ORAL 2 TIMES DAILY
Qty: 14 CAPSULE | Refills: 0 | Status: SHIPPED | OUTPATIENT
Start: 2023-03-16 | End: 2023-03-23

## 2023-03-16 ASSESSMENT — FIBROSIS 4 INDEX: FIB4 SCORE: 0.61

## 2023-03-16 ASSESSMENT — ENCOUNTER SYMPTOMS: FEVER: 0

## 2023-03-16 NOTE — PROGRESS NOTES
"Subjective:     Vicky Cox is a 47 y.o. female who presents for Cough (X 2 weeks, cough, pain with cough in pelvic area, UTI)    HPI  Pt presents for evaluation of an acute problem  Patient with an acute cough for the past 2 weeks  Cough is moderate   Has a productive cough   Having some headaches   No ear pain   No N/V/D     Recently seen in the ER for UTI and was prescribed antibiotics, but never took the medications due to a miscommunication at the pharmacy    Review of Systems   Constitutional:  Negative for fever.     PMH:  has a past medical history of Patient denies medical problems.  MEDS:   Current Outpatient Medications:     cefdinir (OMNICEF) 300 MG Cap, Take 1 Capsule by mouth 2 times a day for 7 days., Disp: 14 Capsule, Rfl: 0    Ibuprofen (MOTRIN PO), Take  by mouth., Disp: , Rfl:   ALLERGIES:   Allergies   Allergen Reactions    Penicillins Rash     Per pt. Tolerated cefdinir 2/23/23     SURGHX: History reviewed. No pertinent surgical history.  SOCHX:  reports that she quit smoking about 5 years ago. Her smoking use included cigarettes. She has never used smokeless tobacco. She reports that she does not currently use alcohol. She reports that she does not currently use drugs after having used the following drugs: Methamphetamines and Marijuana.     Objective:   /74   Pulse (!) 104   Temp 36.2 °C (97.2 °F) (Temporal)   Resp 16   Ht 1.575 m (5' 2\")   Wt 45.8 kg (101 lb)   SpO2 100%   BMI 18.47 kg/m²     Physical Exam  Constitutional:       General: She is not in acute distress.     Appearance: She is well-developed. She is not diaphoretic.   HENT:      Head: Normocephalic and atraumatic.      Right Ear: Tympanic membrane, ear canal and external ear normal.      Left Ear: Tympanic membrane, ear canal and external ear normal.      Nose: Congestion present.      Mouth/Throat:      Mouth: Mucous membranes are moist.      Pharynx: Oropharynx is clear. No oropharyngeal exudate or " posterior oropharyngeal erythema.   Neck:      Trachea: No tracheal deviation.   Cardiovascular:      Rate and Rhythm: Normal rate and regular rhythm.   Pulmonary:      Effort: Pulmonary effort is normal. No respiratory distress.      Breath sounds: Normal breath sounds. No wheezing or rales.   Musculoskeletal:      Cervical back: Normal range of motion and neck supple. Tenderness present.   Lymphadenopathy:      Cervical: Cervical adenopathy present.   Skin:     General: Skin is warm and dry.      Findings: No rash.   Neurological:      Mental Status: She is alert.     Assessment/Plan:   Assessment    1. Acute cystitis without hematuria  - cefdinir (OMNICEF) 300 MG Cap; Take 1 Capsule by mouth 2 times a day for 7 days.  Dispense: 14 Capsule; Refill: 0    Patient with acute cystitis.  Urine culture on 2/23/2023 showed greater than 100,000 E. coli which were resistant to ampicillin and trimethoprim/sulfamethoxazole.  Culture shows sensitivity to cephalosporins.  Treat with cefdinir.  Patient also has a cough which is questionable if it is a viral URI versus possible bacterial etiology.  Lungs are clear and no sign of pneumonia or bronchitis.  Will see how symptoms respond to antibiotics given for UTI and follow-up on an as-needed basis.

## 2023-09-18 ENCOUNTER — OFFICE VISIT (OUTPATIENT)
Dept: URGENT CARE | Facility: CLINIC | Age: 48
End: 2023-09-18
Payer: COMMERCIAL

## 2023-09-18 ENCOUNTER — APPOINTMENT (OUTPATIENT)
Dept: URGENT CARE | Facility: CLINIC | Age: 48
End: 2023-09-18
Payer: COMMERCIAL

## 2023-09-18 VITALS
RESPIRATION RATE: 12 BRPM | TEMPERATURE: 97.8 F | HEIGHT: 62 IN | DIASTOLIC BLOOD PRESSURE: 74 MMHG | WEIGHT: 115 LBS | HEART RATE: 95 BPM | OXYGEN SATURATION: 96 % | SYSTOLIC BLOOD PRESSURE: 112 MMHG | BODY MASS INDEX: 21.16 KG/M2

## 2023-09-18 DIAGNOSIS — R10.9 LT FLANK PAIN: ICD-10-CM

## 2023-09-18 DIAGNOSIS — R35.0 URINARY FREQUENCY: ICD-10-CM

## 2023-09-18 DIAGNOSIS — J45.21 ASTHMA IN ADULT, MILD INTERMITTENT, WITH ACUTE EXACERBATION: Primary | ICD-10-CM

## 2023-09-18 DIAGNOSIS — R05.1 ACUTE COUGH: ICD-10-CM

## 2023-09-18 LAB
APPEARANCE UR: CLEAR
BILIRUB UR STRIP-MCNC: NEGATIVE MG/DL
COLOR UR AUTO: YELLOW
GLUCOSE UR STRIP.AUTO-MCNC: NEGATIVE MG/DL
KETONES UR STRIP.AUTO-MCNC: NEGATIVE MG/DL
LEUKOCYTE ESTERASE UR QL STRIP.AUTO: NEGATIVE
NITRITE UR QL STRIP.AUTO: NEGATIVE
PH UR STRIP.AUTO: 5 [PH] (ref 5–8)
POCT INT CON NEG: NEGATIVE
POCT INT CON POS: POSITIVE
POCT URINE PREGNANCY TEST: NEGATIVE
PROT UR QL STRIP: NEGATIVE MG/DL
RBC UR QL AUTO: NORMAL
SP GR UR STRIP.AUTO: 1.02
UROBILINOGEN UR STRIP-MCNC: 0.2 MG/DL

## 2023-09-18 PROCEDURE — 3078F DIAST BP <80 MM HG: CPT | Performed by: PHYSICIAN ASSISTANT

## 2023-09-18 PROCEDURE — 3074F SYST BP LT 130 MM HG: CPT | Performed by: PHYSICIAN ASSISTANT

## 2023-09-18 PROCEDURE — 99214 OFFICE O/P EST MOD 30 MIN: CPT | Performed by: PHYSICIAN ASSISTANT

## 2023-09-18 PROCEDURE — 81002 URINALYSIS NONAUTO W/O SCOPE: CPT | Performed by: PHYSICIAN ASSISTANT

## 2023-09-18 PROCEDURE — 81025 URINE PREGNANCY TEST: CPT | Performed by: PHYSICIAN ASSISTANT

## 2023-09-18 RX ORDER — ALBUTEROL SULFATE 0.63 MG/3ML
0.63 SOLUTION RESPIRATORY (INHALATION) EVERY 4 HOURS PRN
Qty: 75 ML | Refills: 1 | Status: SHIPPED | OUTPATIENT
Start: 2023-09-18

## 2023-09-18 RX ORDER — IPRATROPIUM BROMIDE AND ALBUTEROL SULFATE 2.5; .5 MG/3ML; MG/3ML
3 SOLUTION RESPIRATORY (INHALATION) ONCE
Status: SHIPPED | OUTPATIENT
Start: 2023-09-18 | End: 2023-09-21

## 2023-09-18 RX ORDER — ALBUTEROL SULFATE 90 UG/1
2 AEROSOL, METERED RESPIRATORY (INHALATION) EVERY 6 HOURS PRN
Qty: 8.5 G | Refills: 0 | Status: SHIPPED | OUTPATIENT
Start: 2023-09-18

## 2023-09-18 RX ORDER — METHYLPREDNISOLONE 4 MG/1
TABLET ORAL
Qty: 21 TABLET | Refills: 0 | Status: SHIPPED | OUTPATIENT
Start: 2023-09-18

## 2023-09-18 ASSESSMENT — ENCOUNTER SYMPTOMS
WHEEZING: 1
SINUS PAIN: 0
SHORTNESS OF BREATH: 1
FEVER: 0
SORE THROAT: 0
FLANK PAIN: 1
COUGH: 1

## 2023-09-18 ASSESSMENT — FIBROSIS 4 INDEX: FIB4 SCORE: 0.61

## 2023-09-18 NOTE — PROGRESS NOTES
Subjective     Vicky Cox is a 47 y.o. female who presents with Pelvic Pain (X 1.5 weeks, SOB)    PMH:  has a past medical history of Patient denies medical problems.  MEDS:   Current Outpatient Medications:     albuterol 108 (90 Base) MCG/ACT Aero Soln inhalation aerosol, Inhale 2 Puffs every 6 hours as needed for Shortness of Breath., Disp: 8.5 g, Rfl: 0    albuterol (ACCUNEB) 0.63 MG/3ML nebulizer solution, Take 3 mL by nebulization every four hours as needed for Shortness of Breath., Disp: 75 mL, Rfl: 1    methylPREDNISolone (MEDROL DOSEPAK) 4 MG Tablet Therapy Pack, Follow schedule on package instructions., Disp: 21 Tablet, Rfl: 0    Ibuprofen (MOTRIN PO), Take  by mouth. (Patient not taking: Reported on 9/18/2023), Disp: , Rfl:     Current Facility-Administered Medications:     ipratropium-albuterol (DUONEB) nebulizer solution, 3 mL, Nebulization, Once, Isabel Arambula, P.A.-C.  ALLERGIES:   Allergies   Allergen Reactions    Penicillins Rash     Per pt. Tolerated cefdinir 2/23/23     SURGHX: History reviewed. No pertinent surgical history.  SOCHX:  reports that she quit smoking about 6 years ago. Her smoking use included cigarettes. She has never used smokeless tobacco. She reports that she does not currently use alcohol. She reports that she does not currently use drugs after having used the following drugs: Methamphetamines and Marijuana.  FH: Reviewed with patient, not pertinent to this visit.           Patient presents with:  Left flank pain, urinary frequency X 1.5 weeks, she is concerned about a UTI.  She also has complaint of wheezing with SOB for the last several days.  Patient has a history of asthma, is out of her inhaler, she is also needs some vials for her nebulizer.  Patient would like a refill of these medications.  No other complaints.      Other  This is a new problem. The current episode started 1 to 4 weeks ago. The problem occurs constantly. The problem has been gradually  "worsening. Associated symptoms include coughing (dry) and urinary symptoms. Pertinent negatives include no congestion, fever or sore throat. Associated symptoms comments: Wheezing, asthma exacerbation. The symptoms are aggravated by exertion and intercourse. She has tried drinking, position changes and rest for the symptoms. The treatment provided no relief.       Review of Systems   Constitutional:  Negative for fever.   HENT:  Negative for congestion, sinus pain and sore throat.    Respiratory:  Positive for cough (dry), shortness of breath and wheezing.    Genitourinary:  Positive for dysuria, flank pain and pelvic pain. Negative for frequency, hematuria and urgency.   All other systems reviewed and are negative.             Objective     /74   Pulse 95   Temp 36.6 °C (97.8 °F) (Temporal)   Resp 12   Ht 1.575 m (5' 2\")   Wt 52.2 kg (115 lb)   SpO2 96%   BMI 21.03 kg/m²      Physical Exam  Vitals and nursing note reviewed.   Constitutional:       General: She is not in acute distress.     Appearance: Normal appearance. She is well-developed. She is not ill-appearing or toxic-appearing.   HENT:      Head: Normocephalic and atraumatic.      Right Ear: Tympanic membrane normal.      Left Ear: Tympanic membrane normal.      Nose: Nose normal.      Mouth/Throat:      Lips: Pink.      Mouth: Mucous membranes are moist.      Pharynx: Oropharynx is clear. Uvula midline.   Eyes:      Extraocular Movements: Extraocular movements intact.      Conjunctiva/sclera: Conjunctivae normal.      Pupils: Pupils are equal, round, and reactive to light.   Cardiovascular:      Rate and Rhythm: Normal rate and regular rhythm.      Pulses: Normal pulses.      Heart sounds: Normal heart sounds.   Pulmonary:      Effort: Pulmonary effort is normal.      Breath sounds: No stridor. Wheezing (auditory) present. No rhonchi or rales.   Abdominal:      General: Bowel sounds are normal.      Palpations: Abdomen is soft. "   Musculoskeletal:         General: Normal range of motion.      Cervical back: Normal range of motion and neck supple.   Skin:     General: Skin is warm and dry.      Capillary Refill: Capillary refill takes less than 2 seconds.   Neurological:      General: No focal deficit present.      Mental Status: She is alert and oriented to person, place, and time.      Cranial Nerves: No cranial nerve deficit.      Motor: Motor function is intact.      Coordination: Coordination is intact.      Gait: Gait normal.   Psychiatric:         Mood and Affect: Mood normal.                             Assessment & Plan                1. Asthma in adult, mild intermittent, with acute exacerbation  ipratropium-albuterol (DUONEB) nebulizer solution    albuterol 108 (90 Base) MCG/ACT Aero Soln inhalation aerosol    albuterol (ACCUNEB) 0.63 MG/3ML nebulizer solution    methylPREDNISolone (MEDROL DOSEPAK) 4 MG Tablet Therapy Pack      2. Acute cough  ipratropium-albuterol (DUONEB) nebulizer solution    albuterol 108 (90 Base) MCG/ACT Aero Soln inhalation aerosol    albuterol (ACCUNEB) 0.63 MG/3ML nebulizer solution    methylPREDNISolone (MEDROL DOSEPAK) 4 MG Tablet Therapy Pack      3. Lt flank pain  POCT Urinalysis    POCT Pregnancy    ipratropium-albuterol (DUONEB) nebulizer solution    albuterol 108 (90 Base) MCG/ACT Aero Soln inhalation aerosol    albuterol (ACCUNEB) 0.63 MG/3ML nebulizer solution    methylPREDNISolone (MEDROL DOSEPAK) 4 MG Tablet Therapy Pack      4. Urinary frequency  POCT Urinalysis    POCT Pregnancy        UA: Negative other than trace blood  Pregnancy: Negative  Patient UA is negative other than trace blood, I feel patient's back pain may be more due to respiratory issues and increased work of breathing secondary to the wheezing rather than urinary symptoms.       Patient's wheezing is audible, consistent with acute asthma exacerbation.  I have sent a prescription for albuterol inhaler with a refill, as well as  vials for her nebulizer with a refill.  I will also treat with Medrol Dosepak as I feel she would benefit from steroid treatment today.    Pt states symptoms have improved after neb treatment, on re-eval wheezing has improved and air movement better throughout.     Differential diagnosis, supportive care, and indications for immediate follow-up discussed with patient.  Instructed to return to clinic or nearest emergency department for any change in condition, further concerns, or worsening of symptoms.    I personally reviewed prior external notes and test results pertinent to today's visit.  I have independently reviewed and interpreted all diagnostics ordered during this urgent care visit.    PT should follow up with PCP in 1-2 days for re-evaluation if symptoms have not improved.      Discussed red flags and reasons to return to UC or ED.      Pt and/or family verbalized understanding of diagnosis and follow up instructions and was offered informational handout on diagnosis.  PT discharged.     Please note that this dictation was created using voice recognition software. I have made every reasonable attempt to correct obvious errors, but I expect that there may be errors of grammar and possibly content that I did not discover before finalizing the note.

## 2024-01-20 ENCOUNTER — APPOINTMENT (OUTPATIENT)
Dept: URGENT CARE | Facility: CLINIC | Age: 49
End: 2024-01-20
Payer: COMMERCIAL

## 2024-03-05 ENCOUNTER — APPOINTMENT (OUTPATIENT)
Dept: RADIOLOGY | Facility: IMAGING CENTER | Age: 49
End: 2024-03-05
Attending: NURSE PRACTITIONER
Payer: COMMERCIAL

## 2024-03-05 ENCOUNTER — OFFICE VISIT (OUTPATIENT)
Dept: URGENT CARE | Facility: CLINIC | Age: 49
End: 2024-03-05
Payer: COMMERCIAL

## 2024-03-05 VITALS
HEART RATE: 113 BPM | DIASTOLIC BLOOD PRESSURE: 66 MMHG | HEIGHT: 62 IN | WEIGHT: 111 LBS | SYSTOLIC BLOOD PRESSURE: 102 MMHG | OXYGEN SATURATION: 91 % | TEMPERATURE: 96.5 F | RESPIRATION RATE: 18 BRPM | BODY MASS INDEX: 20.43 KG/M2

## 2024-03-05 DIAGNOSIS — J10.1 INFLUENZA B: ICD-10-CM

## 2024-03-05 DIAGNOSIS — R68.89 FLU-LIKE SYMPTOMS: ICD-10-CM

## 2024-03-05 DIAGNOSIS — R06.02 SHORTNESS OF BREATH: ICD-10-CM

## 2024-03-05 DIAGNOSIS — J45.20 MILD INTERMITTENT ASTHMA, UNSPECIFIED WHETHER COMPLICATED: ICD-10-CM

## 2024-03-05 LAB
FLUAV RNA SPEC QL NAA+PROBE: NEGATIVE
FLUBV RNA SPEC QL NAA+PROBE: POSITIVE
RSV RNA SPEC QL NAA+PROBE: NEGATIVE
SARS-COV-2 RNA RESP QL NAA+PROBE: NEGATIVE

## 2024-03-05 PROCEDURE — 99214 OFFICE O/P EST MOD 30 MIN: CPT | Performed by: NURSE PRACTITIONER

## 2024-03-05 PROCEDURE — 3078F DIAST BP <80 MM HG: CPT | Performed by: NURSE PRACTITIONER

## 2024-03-05 PROCEDURE — 3074F SYST BP LT 130 MM HG: CPT | Performed by: NURSE PRACTITIONER

## 2024-03-05 PROCEDURE — 87637 SARSCOV2&INF A&B&RSV AMP PRB: CPT | Mod: QW | Performed by: NURSE PRACTITIONER

## 2024-03-05 PROCEDURE — 71046 X-RAY EXAM CHEST 2 VIEWS: CPT | Mod: TC | Performed by: NURSE PRACTITIONER

## 2024-03-05 RX ORDER — ALBUTEROL SULFATE 90 UG/1
2 AEROSOL, METERED RESPIRATORY (INHALATION) EVERY 6 HOURS PRN
Qty: 8.5 G | Refills: 0 | Status: SHIPPED | OUTPATIENT
Start: 2024-03-05

## 2024-03-05 RX ORDER — OSELTAMIVIR PHOSPHATE 75 MG/1
75 CAPSULE ORAL 2 TIMES DAILY
Qty: 10 CAPSULE | Refills: 0 | Status: SHIPPED | OUTPATIENT
Start: 2024-03-05

## 2024-03-05 ASSESSMENT — ENCOUNTER SYMPTOMS
SPUTUM PRODUCTION: 1
SORE THROAT: 1
FEVER: 0
SHORTNESS OF BREATH: 1
EYE DISCHARGE: 0
WHEEZING: 1
DIARRHEA: 0
COUGH: 1
NAUSEA: 0
CHILLS: 0
MYALGIAS: 0
ORTHOPNEA: 0
HEADACHES: 1

## 2024-03-05 ASSESSMENT — FIBROSIS 4 INDEX: FIB4 SCORE: 0.63

## 2024-03-05 NOTE — PROGRESS NOTES
"Subjective     Vicky Cox is a 48 y.o. female who presents with Cough (SOB, fever, x 3 days /Hx of asthma )            HPI  New problem.  Patient is a 48-year-old female who presents with cough, shortness of breath, and fever x 3 days.  She does report a history of asthma.  She has had associated sore throat, and nasal congestion.  She denies vomiting or diarrhea.  She has not been vaccinated for influenza.  She has been taking over-the-counter medications only for her symptoms.    Penicillins  Current Outpatient Medications on File Prior to Visit   Medication Sig Dispense Refill    Ibuprofen (MOTRIN PO) Take  by mouth.      albuterol 108 (90 Base) MCG/ACT Aero Soln inhalation aerosol Inhale 2 Puffs every 6 hours as needed for Shortness of Breath. (Patient not taking: Reported on 3/5/2024) 8.5 g 0    albuterol (ACCUNEB) 0.63 MG/3ML nebulizer solution Take 3 mL by nebulization every four hours as needed for Shortness of Breath. (Patient not taking: Reported on 3/5/2024) 75 mL 1    methylPREDNISolone (MEDROL DOSEPAK) 4 MG Tablet Therapy Pack Follow schedule on package instructions. (Patient not taking: Reported on 3/5/2024) 21 Tablet 0     No current facility-administered medications on file prior to visit.         Review of Systems   Constitutional:  Positive for malaise/fatigue. Negative for chills and fever.   HENT:  Positive for congestion and sore throat.    Eyes:  Negative for discharge.   Respiratory:  Positive for cough, sputum production, shortness of breath and wheezing.    Cardiovascular:  Negative for chest pain and orthopnea.   Gastrointestinal:  Negative for diarrhea and nausea.   Musculoskeletal:  Negative for myalgias.   Neurological:  Positive for headaches.   Endo/Heme/Allergies:  Negative for environmental allergies.              Objective     /66   Pulse (!) 113   Temp 35.8 °C (96.5 °F)   Resp 18   Ht 1.575 m (5' 2\")   Wt 50.3 kg (111 lb)   SpO2 91%   BMI 20.30 kg/m²  "     Physical Exam  Vitals and nursing note reviewed.   Constitutional:       General: She is not in acute distress.     Appearance: Normal appearance. She is well-developed.   HENT:      Head: Normocephalic and atraumatic.      Right Ear: Tympanic membrane, ear canal and external ear normal. No middle ear effusion. Tympanic membrane is not injected or perforated.      Left Ear: Tympanic membrane, ear canal and external ear normal.  No middle ear effusion. Tympanic membrane is not injected or perforated.      Nose: Mucosal edema, congestion and rhinorrhea present.      Mouth/Throat:      Pharynx: Posterior oropharyngeal erythema present. No oropharyngeal exudate.   Eyes:      General:         Right eye: No discharge.         Left eye: No discharge.      Conjunctiva/sclera: Conjunctivae normal.   Cardiovascular:      Rate and Rhythm: Regular rhythm. Tachycardia present.      Heart sounds: Normal heart sounds. No murmur heard.  Pulmonary:      Effort: Pulmonary effort is normal. No respiratory distress.      Breath sounds: Normal breath sounds.   Musculoskeletal:         General: Normal range of motion.      Cervical back: Normal range of motion and neck supple.      Comments: Normal movement of all 4 extremities.   Lymphadenopathy:      Cervical: No cervical adenopathy.      Upper Body:      Right upper body: No supraclavicular adenopathy.      Left upper body: No supraclavicular adenopathy.   Skin:     General: Skin is warm and dry.   Neurological:      Mental Status: She is alert and oriented to person, place, and time.      Gait: Gait normal.   Psychiatric:         Behavior: Behavior normal.         Thought Content: Thought content normal.                             Assessment & Plan        1. Influenza B  oseltamivir (TAMIFLU) 75 MG Cap      2. Flu-like symptoms  POCT CoV-2, Flu A/B, RSV by PCR      3. Mild intermittent asthma, unspecified whether complicated  albuterol 108 (90 Base) MCG/ACT Aero Soln inhalation  aerosol      4. Shortness of breath  DX-CHEST-2 VIEWS            Positive flu b  X-ray negative.   Refill on albuterol.  Rx for tamiflu to pharmacy  Left message for patient on Mychart as no phones listed are in service.  Differential diagnosis, natural history, supportive care, and indications for immediate follow-up were discussed.

## 2024-05-20 ENCOUNTER — OFFICE VISIT (OUTPATIENT)
Dept: URGENT CARE | Facility: CLINIC | Age: 49
End: 2024-05-20
Payer: COMMERCIAL

## 2024-05-20 VITALS
DIASTOLIC BLOOD PRESSURE: 60 MMHG | SYSTOLIC BLOOD PRESSURE: 118 MMHG | HEART RATE: 90 BPM | TEMPERATURE: 98.4 F | OXYGEN SATURATION: 99 % | BODY MASS INDEX: 21.03 KG/M2 | WEIGHT: 115 LBS | RESPIRATION RATE: 16 BRPM

## 2024-05-20 DIAGNOSIS — J45.20 MILD INTERMITTENT ASTHMA, UNSPECIFIED WHETHER COMPLICATED: ICD-10-CM

## 2024-05-20 DIAGNOSIS — N30.00 ACUTE CYSTITIS WITHOUT HEMATURIA: ICD-10-CM

## 2024-05-20 PROCEDURE — 99213 OFFICE O/P EST LOW 20 MIN: CPT | Performed by: FAMILY MEDICINE

## 2024-05-20 PROCEDURE — 3074F SYST BP LT 130 MM HG: CPT | Performed by: FAMILY MEDICINE

## 2024-05-20 PROCEDURE — 3078F DIAST BP <80 MM HG: CPT | Performed by: FAMILY MEDICINE

## 2024-05-20 RX ORDER — SULFAMETHOXAZOLE AND TRIMETHOPRIM 800; 160 MG/1; MG/1
1 TABLET ORAL 2 TIMES DAILY
Qty: 14 TABLET | Refills: 0 | Status: SHIPPED | OUTPATIENT
Start: 2024-05-20 | End: 2024-05-27

## 2024-05-20 RX ORDER — ALBUTEROL SULFATE 90 UG/1
2 AEROSOL, METERED RESPIRATORY (INHALATION) EVERY 6 HOURS PRN
Qty: 8.5 G | Refills: 0 | Status: SHIPPED | OUTPATIENT
Start: 2024-05-20

## 2024-05-20 ASSESSMENT — ENCOUNTER SYMPTOMS
RESPIRATORY NEGATIVE: 1
GASTROINTESTINAL NEGATIVE: 1
FLANK PAIN: 1
CARDIOVASCULAR NEGATIVE: 1
CONSTITUTIONAL NEGATIVE: 1

## 2024-05-20 ASSESSMENT — FIBROSIS 4 INDEX: FIB4 SCORE: 0.63

## 2024-05-21 NOTE — PROGRESS NOTES
Subjective:   Vicky Cox is a 48 y.o. female who presents for Flank Pain (B/L flank pain)      Patient with flank pain, urine odor        Review of Systems   Constitutional: Negative.    HENT: Negative.     Respiratory: Negative.     Cardiovascular: Negative.    Gastrointestinal: Negative.    Genitourinary:  Positive for dysuria, flank pain and frequency. Negative for hematuria.   Skin: Negative.        Medications, Allergies, and current problem list reviewed today in Epic.     Objective:     /60   Pulse 90   Temp 36.9 °C (98.4 °F) (Temporal)   Resp 16   Wt 52.2 kg (115 lb)   SpO2 99%     Physical Exam  Vitals and nursing note reviewed.   Constitutional:       Appearance: Normal appearance.   HENT:      Head: Normocephalic and atraumatic.   Cardiovascular:      Rate and Rhythm: Normal rate and regular rhythm.      Pulses: Normal pulses.      Heart sounds: Normal heart sounds.   Pulmonary:      Effort: Pulmonary effort is normal.      Breath sounds: Normal breath sounds.   Abdominal:      General: Abdomen is flat. Bowel sounds are normal.      Palpations: Abdomen is soft.   Neurological:      Mental Status: She is alert.         Assessment/Plan:     Diagnosis and associated orders:     1. Acute cystitis without hematuria  sulfamethoxazole-trimethoprim (BACTRIM DS) 800-160 MG tablet      2. Mild intermittent asthma, unspecified whether complicated  albuterol (VENTOLIN HFA) 108 (90 Base) MCG/ACT Aero Soln inhalation aerosol         Comments/MDM:              Differential diagnosis, natural history, supportive care, and indications for immediate follow-up discussed.    Advised the patient to follow-up with the primary care physician for recheck, reevaluation, and consideration of further management.    Please note that this dictation was created using voice recognition software. I have made a reasonable attempt to correct obvious errors, but I expect that there are errors of grammar and possibly  content that I did not discover before finalizing the note.    This note was electronically signed by Nicola Jiménez M.D.

## 2025-10-21 ENCOUNTER — APPOINTMENT (OUTPATIENT)
Dept: RADIOLOGY | Facility: MEDICAL CENTER | Age: 50
End: 2025-10-21
Attending: PHYSICIAN ASSISTANT
Payer: COMMERCIAL

## 2025-10-21 DIAGNOSIS — Z12.31 VISIT FOR SCREENING MAMMOGRAM: ICD-10-CM
